# Patient Record
Sex: FEMALE | Race: WHITE | Employment: FULL TIME | URBAN - METROPOLITAN AREA
[De-identification: names, ages, dates, MRNs, and addresses within clinical notes are randomized per-mention and may not be internally consistent; named-entity substitution may affect disease eponyms.]

---

## 2018-01-03 ENCOUNTER — HOSPITAL ENCOUNTER (INPATIENT)
Age: 63
LOS: 4 days | Discharge: HOME | DRG: 871 | End: 2018-01-07
Attending: EMERGENCY MEDICINE | Admitting: HOSPITALIST
Payer: COMMERCIAL

## 2018-01-03 DIAGNOSIS — N61.1 ABSCESS OF BREAST: ICD-10-CM

## 2018-01-03 DIAGNOSIS — E11.10 DIABETIC KETOACIDOSIS WITHOUT COMA ASSOCIATED WITH TYPE 2 DIABETES MELLITUS (HCC): Primary | ICD-10-CM

## 2018-01-03 DIAGNOSIS — L03.319 CELLULITIS OF TRUNK, UNSPECIFIED SITE OF TRUNK: ICD-10-CM

## 2018-01-03 PROBLEM — I10 HTN (HYPERTENSION): Status: ACTIVE | Noted: 2018-01-03

## 2018-01-03 PROBLEM — R50.9 FEVER: Status: ACTIVE | Noted: 2018-01-03

## 2018-01-03 PROBLEM — D72.829 LEUKOCYTOSIS: Status: ACTIVE | Noted: 2018-01-03

## 2018-01-03 PROBLEM — N39.0 UTI (URINARY TRACT INFECTION): Status: ACTIVE | Noted: 2018-01-03

## 2018-01-03 PROBLEM — A41.9 SEPSIS (HCC): Status: ACTIVE | Noted: 2018-01-03

## 2018-01-03 LAB
ALBUMIN SERPL-MCNC: 2.7 G/DL (ref 3.2–4.6)
ALBUMIN/GLOB SERPL: 0.5 {RATIO} (ref 1.2–3.5)
ALP SERPL-CCNC: 127 U/L (ref 50–136)
ALT SERPL-CCNC: 12 U/L (ref 12–65)
ANION GAP SERPL CALC-SCNC: 19 MMOL/L (ref 7–16)
APPEARANCE UR: ABNORMAL
AST SERPL-CCNC: 8 U/L (ref 15–37)
BACTERIA URNS QL MICRO: ABNORMAL /HPF
BASOPHILS # BLD: 0 K/UL (ref 0–0.2)
BASOPHILS NFR BLD: 0 % (ref 0–2)
BILIRUB SERPL-MCNC: 0.9 MG/DL (ref 0.2–1.1)
BILIRUB UR QL: NEGATIVE
BUN SERPL-MCNC: 21 MG/DL (ref 8–23)
CALCIUM SERPL-MCNC: 8.8 MG/DL (ref 8.3–10.4)
CASTS URNS QL MICRO: 0 /LPF
CHLORIDE SERPL-SCNC: 98 MMOL/L (ref 98–107)
CO2 SERPL-SCNC: 16 MMOL/L (ref 21–32)
COLOR UR: YELLOW
CREAT SERPL-MCNC: 1.23 MG/DL (ref 0.6–1)
DIFFERENTIAL METHOD BLD: ABNORMAL
EOSINOPHIL # BLD: 0 K/UL (ref 0–0.8)
EOSINOPHIL NFR BLD: 0 % (ref 0.5–7.8)
EPI CELLS #/AREA URNS HPF: ABNORMAL /HPF
ERYTHROCYTE [DISTWIDTH] IN BLOOD BY AUTOMATED COUNT: 15.9 % (ref 11.9–14.6)
GLOBULIN SER CALC-MCNC: 5.6 G/DL (ref 2.3–3.5)
GLUCOSE BLD STRIP.AUTO-MCNC: 196 MG/DL (ref 65–100)
GLUCOSE BLD STRIP.AUTO-MCNC: 243 MG/DL (ref 65–100)
GLUCOSE BLD STRIP.AUTO-MCNC: 338 MG/DL (ref 65–100)
GLUCOSE BLD STRIP.AUTO-MCNC: 367 MG/DL (ref 65–100)
GLUCOSE BLD STRIP.AUTO-MCNC: 389 MG/DL (ref 65–100)
GLUCOSE BLD STRIP.AUTO-MCNC: 494 MG/DL (ref 65–100)
GLUCOSE SERPL-MCNC: 499 MG/DL (ref 65–100)
GLUCOSE UR STRIP.AUTO-MCNC: >1000 MG/DL
HCT VFR BLD AUTO: 34.7 % (ref 35.8–46.3)
HGB BLD-MCNC: 11.8 G/DL (ref 11.7–15.4)
HGB UR QL STRIP: ABNORMAL
IMM GRANULOCYTES # BLD: 0.1 K/UL (ref 0–0.5)
IMM GRANULOCYTES NFR BLD AUTO: 1 % (ref 0–5)
KETONES UR QL STRIP.AUTO: 40 MG/DL
LACTATE BLD-SCNC: 1.8 MMOL/L (ref 0.5–1.9)
LEUKOCYTE ESTERASE UR QL STRIP.AUTO: ABNORMAL
LYMPHOCYTES # BLD: 1 K/UL (ref 0.5–4.6)
LYMPHOCYTES NFR BLD: 6 % (ref 13–44)
MCH RBC QN AUTO: 30.3 PG (ref 26.1–32.9)
MCHC RBC AUTO-ENTMCNC: 34 G/DL (ref 31.4–35)
MCV RBC AUTO: 89 FL (ref 79.6–97.8)
MONOCYTES # BLD: 1.1 K/UL (ref 0.1–1.3)
MONOCYTES NFR BLD: 7 % (ref 4–12)
NEUTS SEG # BLD: 13.7 K/UL (ref 1.7–8.2)
NEUTS SEG NFR BLD: 86 % (ref 43–78)
NITRITE UR QL STRIP.AUTO: NEGATIVE
PH UR STRIP: 5 [PH] (ref 5–9)
PLATELET # BLD AUTO: 286 K/UL (ref 150–450)
PMV BLD AUTO: 11 FL (ref 10.8–14.1)
POTASSIUM SERPL-SCNC: 4.3 MMOL/L (ref 3.5–5.1)
PROCALCITONIN SERPL-MCNC: 0.4 NG/ML
PROT SERPL-MCNC: 8.3 G/DL (ref 6.3–8.2)
PROT UR STRIP-MCNC: 100 MG/DL
RBC # BLD AUTO: 3.9 M/UL (ref 4.05–5.25)
RBC #/AREA URNS HPF: ABNORMAL /HPF
SODIUM SERPL-SCNC: 133 MMOL/L (ref 136–145)
SP GR UR REFRACTOMETRY: 1.02 (ref 1–1.02)
UROBILINOGEN UR QL STRIP.AUTO: 0.2 EU/DL (ref 0.2–1)
WBC # BLD AUTO: 15.9 K/UL (ref 4.3–11.1)
WBC URNS QL MICRO: ABNORMAL /HPF

## 2018-01-03 PROCEDURE — 93005 ELECTROCARDIOGRAM TRACING: CPT | Performed by: EMERGENCY MEDICINE

## 2018-01-03 PROCEDURE — 81001 URINALYSIS AUTO W/SCOPE: CPT | Performed by: EMERGENCY MEDICINE

## 2018-01-03 PROCEDURE — 85025 COMPLETE CBC W/AUTO DIFF WBC: CPT | Performed by: STUDENT IN AN ORGANIZED HEALTH CARE EDUCATION/TRAINING PROGRAM

## 2018-01-03 PROCEDURE — 80053 COMPREHEN METABOLIC PANEL: CPT | Performed by: STUDENT IN AN ORGANIZED HEALTH CARE EDUCATION/TRAINING PROGRAM

## 2018-01-03 PROCEDURE — 99285 EMERGENCY DEPT VISIT HI MDM: CPT | Performed by: EMERGENCY MEDICINE

## 2018-01-03 PROCEDURE — 82962 GLUCOSE BLOOD TEST: CPT

## 2018-01-03 PROCEDURE — 87088 URINE BACTERIA CULTURE: CPT | Performed by: EMERGENCY MEDICINE

## 2018-01-03 PROCEDURE — 74011250636 HC RX REV CODE- 250/636: Performed by: EMERGENCY MEDICINE

## 2018-01-03 PROCEDURE — 74011000258 HC RX REV CODE- 258: Performed by: EMERGENCY MEDICINE

## 2018-01-03 PROCEDURE — 96374 THER/PROPH/DIAG INJ IV PUSH: CPT | Performed by: EMERGENCY MEDICINE

## 2018-01-03 PROCEDURE — 74011636637 HC RX REV CODE- 636/637: Performed by: EMERGENCY MEDICINE

## 2018-01-03 PROCEDURE — 87186 SC STD MICRODIL/AGAR DIL: CPT | Performed by: EMERGENCY MEDICINE

## 2018-01-03 PROCEDURE — 87040 BLOOD CULTURE FOR BACTERIA: CPT | Performed by: EMERGENCY MEDICINE

## 2018-01-03 PROCEDURE — 87086 URINE CULTURE/COLONY COUNT: CPT | Performed by: EMERGENCY MEDICINE

## 2018-01-03 PROCEDURE — 84145 PROCALCITONIN (PCT): CPT | Performed by: EMERGENCY MEDICINE

## 2018-01-03 PROCEDURE — 83605 ASSAY OF LACTIC ACID: CPT

## 2018-01-03 RX ORDER — HYDRALAZINE HYDROCHLORIDE 20 MG/ML
10 INJECTION INTRAMUSCULAR; INTRAVENOUS
Status: DISCONTINUED | OUTPATIENT
Start: 2018-01-03 | End: 2018-01-07 | Stop reason: HOSPADM

## 2018-01-03 RX ADMIN — SODIUM CHLORIDE 1000 ML: 900 INJECTION, SOLUTION INTRAVENOUS at 19:06

## 2018-01-03 RX ADMIN — HUMAN INSULIN 10 UNITS: 100 INJECTION, SOLUTION SUBCUTANEOUS at 19:34

## 2018-01-03 RX ADMIN — SODIUM CHLORIDE 7 UNITS/HR: 900 INJECTION, SOLUTION INTRAVENOUS at 20:16

## 2018-01-03 NOTE — ED TRIAGE NOTES
Pt arrived via ems from her md office. Pt's blood sugar was elevated. Initially it was 800 then 500 via ems. BGL is 494. Pt stopped taking her metformin 9 months ago.

## 2018-01-03 NOTE — IP AVS SNAPSHOT
[address] [facility] 
 
 
 [address] [facility] 
[phone] Patient: Tomasa Gonzalez MRN: [id number] ONC:[birth year] About your hospitalization You were admitted on:  January 3, 2018 You last received care in the:  [facility] 8 MED SURG You were discharged on:  January 7, 2018 Why you were hospitalized Your primary diagnosis was:  Dka (Diabetic Ketoacidoses) (Hcc) Your diagnoses also included:  Cellulitis Of Right Breast, Htn (Hypertension), Sepsis Due To Cellulitis (Hcc), Uti (Urinary Tract Infection) Follow-up Information Follow up With Details Comments Contact Info Roseann Atkins MD In 1 week Patient to call Monday and make appt within 1 week for follow up diabetes and cellulitis [address] [facility] [address] [facility] 
[phone] Discharge Orders None A check joyce indicates which time of day the medication should be taken. My Medications START taking these medications Instructions Each Dose to Equal  
 Morning Noon Evening Bedtime  
 cephALEXin 500 mg capsule Commonly known as:  Kait Gaytrae Your next dose is:  Due tonight at bedtime Take 1 Cap by mouth two (2) times a day for 7 days. 500 mg  
    
   
   
 9:00 PM  
  
 glucose blood VI test strips strip Commonly known as:  blood glucose test  
   
 Please dispense test strips for use with patient's glucose monitor * insulin glargine 100 unit/mL injection Commonly known as:  LANTUS Your next dose is:  Due in AM  
   
 40 Units by SubCUTAneous route daily. 40 Units * insulin glargine 100 unit/mL (3 mL) Inpn Commonly known as:  LANTUS,BASAGLAR  
   
 40 Units by SubCUTAneous route daily. 40 Units  
    
   
   
   
  
 insulin lispro 100 unit/mL injection Commonly known as:  HUMALOG Your next dose is:  Due before supper tonight 5 Units by SubCUTAneous route Before breakfast, lunch, and dinner. 5 Units 6:00 PM  
   
  
 Insulin Needles (Disposable) 30 gauge x 1/3\" by SubCUTAneous route See Admin Instructions. insulin syringe-needle U-100 0.3 mL 31 gauge x 15/64\" Syrg  
   
 100 Syringes by Does Not Apply route See Admin Instructions. Indications: for insulin administration 100 Syringe Lancets Misc For checking blood sugar level  
     
   
   
   
  
 lisinopril 20 mg tablet Commonly known as:  Leonor Averymer Your next dose is:  Due in AM  
   
 Take 1 Tab by mouth daily. 20 mg  
    
   
   
   
  
 nystatin powder Commonly known as:  MYCOSTATIN Your next dose is:  Due tonight at bedtime Apply  to affected area three (3) times daily for 14 days. 9:00 pm  
  
 trimethoprim-sulfamethoxazole 160-800 mg per tablet Commonly known as:  BACTRIM DS, SEPTRA DS Your next dose is:  Due tonight at bedtime Take 1 Tab by mouth two (2) times a day for 7 days. 1 Tab  
    
   
   
 9:00 pm  
  
 * Notice: This list has 2 medication(s) that are the same as other medications prescribed for you. Read the directions carefully, and ask your doctor or other care provider to review them with you. Where to Get Your Medications Information on where to get these meds will be given to you by the nurse or doctor. ! Ask your nurse or doctor about these medications  
  cephALEXin 500 mg capsule  
 glucose blood VI test strips strip  
 insulin glargine 100 unit/mL (3 mL) Inpn  
 insulin glargine 100 unit/mL injection  
 insulin lispro 100 unit/mL injection Insulin Needles (Disposable) 30 gauge x 1/3\" insulin syringe-needle U-100 0.3 mL 31 gauge x 15/64\" Syrg Lancets Misc  
 lisinopril 20 mg tablet  
 nystatin powder  
 trimethoprim-sulfamethoxazole 160-800 mg per tablet Discharge Instructions Cellulitis: Care Instructions Your Care Instructions Cellulitis is a skin infection. It often occurs after a break in the skin from a scrape, cut, bite, or puncture, or after a rash. The doctor has checked you carefully, but problems can develop later. If you notice any problems or new symptoms, get medical treatment right away. Follow-up care is a key part of your treatment and safety. Be sure to make and go to all appointments, and call your doctor if you are having problems. It's also a good idea to know your test results and keep a list of the medicines you take. How can you care for yourself at home? · Take your antibiotics as directed. Do not stop taking them just because you feel better. You need to take the full course of antibiotics. · Prop up the infected area on pillows to reduce pain and swelling. Try to keep the area above the level of your heart as often as you can. · If your doctor told you how to care for your wound, follow your doctor's instructions. If you did not get instructions, follow this general advice: ¨ Wash the wound with clean water 2 times a day. Don't use hydrogen peroxide or alcohol, which can slow healing. ¨ You may cover the wound with a thin layer of petroleum jelly, such as Vaseline, and a nonstick bandage. ¨ Apply more petroleum jelly and replace the bandage as needed. · Be safe with medicines. Take pain medicines exactly as directed. ¨ If the doctor gave you a prescription medicine for pain, take it as prescribed. ¨ If you are not taking a prescription pain medicine, ask your doctor if you can take an over-the-counter medicine. To prevent cellulitis in the future · Try to prevent cuts, scrapes, or other injuries to your skin. Cellulitis most often occurs where there is a break in the skin. · If you get a scrape, cut, mild burn, or bite, wash the wound with clean water as soon as you can to help avoid infection.  Don't use hydrogen peroxide or alcohol, which can slow healing. · If you have swelling in your legs (edema), support stockings and good skin care may help prevent leg sores and cellulitis. · Take care of your feet, especially if you have diabetes or other conditions that increase the risk of infection. Wear shoes and socks. Do not go barefoot. If you have athlete's foot or other skin problems on your feet, talk to your doctor about how to treat them. When should you call for help? Call your doctor now or seek immediate medical care if: 
? · You have signs that your infection is getting worse, such as: 
¨ Increased pain, swelling, warmth, or redness. ¨ Red streaks leading from the area. ¨ Pus draining from the area. ¨ A fever. ? · You get a rash. ? Watch closely for changes in your health, and be sure to contact your doctor if: 
? · You are not getting better after 1 day (24 hours). ? · You do not get better as expected. Where can you learn more? Go to http://todd-aleks.info/. Shira Ayon in the search box to learn more about \"Cellulitis: Care Instructions. \" Current as of: October 13, 2016 Content Version: 11.4 © 7540-7756 [employer]. Care instructions adapted under license by [employer] (which disclaims liability or warranty for this information). If you have questions about a medical condition or this instruction, always ask your healthcare professional. [place] [address] any warranty or liability for your use of this information. DISCHARGE SUMMARY from Nurse PATIENT INSTRUCTIONS: 
 
 
F-face looks uneven A-arms unable to move or move unevenly S-speech slurred or non-existent T-time-call 911 as soon as signs and symptoms begin-DO NOT go Back to bed or wait to see if you get better-TIME IS BRAIN.  
 
Warning Signs of HEART ATTACK  
 
 Call 911 if you have these symptoms: 
? Chest discomfort. Most heart attacks involve discomfort in the center of the chest that lasts more than a few minutes, or that goes away and comes back. It can feel like uncomfortable pressure, squeezing, fullness, or pain. ? Discomfort in other areas of the upper body. Symptoms can include pain or discomfort in one or both arms, the back, neck, jaw, or stomach. ? Shortness of breath with or without chest discomfort. ? Other signs may include breaking out in a cold sweat, nausea, or lightheadedness. Don't wait more than five minutes to call [address]! Fast action can save your life. Calling 911 is almost always the fastest way to get lifesaving treatment. Emergency Medical Services staff can begin treatment when they arrive up to an hour sooner than if someone gets to the hospital by car. The discharge information has been reviewed with the patient. The patient verbalized understanding. Discharge medications reviewed with the patient and appropriate educational materials and side effects teaching were provided. ___________________________________________________________________________________________________________________________________ [employer]hart Announcement We are excited to announce that we are making your provider's discharge notes available to you in ParStream. You will see these notes when they are completed and signed by the physician that discharged you from your recent hospital stay. If you have any questions or concerns about any information you see in ParStream, please call the Health Information Department where you were seen or reach out to your Primary Care Provider for more information about your plan of care. Introducing [facility]! Tabby Meraz introduces ParStream patient portal. Now you can access parts of your medical record, email your doctor's office, and request medication refills online. 1. In your internet browser, go to [email or web address] "[employer]/The Daily Callert 2. Click on the First Time User? Click Here link in the Sign In box. You will see the New Member Sign Up page. 3. Enter your Mingyian Access Code exactly as it appears below. You will not need to use this code after youve completed the sign-up process. If you do not sign up before the expiration date, you must request a new code. · Ming[employer]ian Access Code: [email or web address] Expires: 4/3/2018  9:34 PM 
 
4. Enter the last four digits of your Social Security Number (xxxx) and Date of Birth (mm/dd/yyyy) as indicated and click Submit. You will be taken to the next sign-up page. 5. Create a Tembo Studiot ID. This will be your Mingyian login ID and cannot be changed, so think of one that is secure and easy to remember. 6. Create a Mingyian password. You can change your password at any time. 7. Enter your Password Reset Question and Answer. This can be used at a later time if you forget your password. 8. Enter your e-mail address. You will receive e-mail notification when new information is available in [address] 9. Click Sign Up. You can now view and download portions of your medical record. 10. Click the Download Summary menu link to download a portable copy of your medical information. If you have questions, please visit the Frequently Asked Questions section of the Mingyian website. Remember, Mingyian is NOT to be used for urgent needs. For medical emergencies, dial 911. Now available from your iPhone and Android! Unresulted Labs-Please follow up with your PCP about these lab tests Order Current Status CULTURE, BLOOD Preliminary result CULTURE, BLOOD Preliminary result Providers Seen During Your Hospitalization Provider Specialty Primary office phone Beulah Arreguin MD Emergency Medicine [phone] Khoa Yoder MD Internal Medicine [phone] Your Primary Care Physician (PCP) Primary Care Physician Office Phone Office Fax [address] [facility] [phone] You are allergic to the following Allergen Reactions Plavix (Clopidogrel) Rash Recent Documentation Height Weight BMI OB Status Smoking Status 1.626 m 104.3 kg 39.48 kg/m2 Hysterectomy Never Smoker Emergency Contacts Name Discharge Info Relation Home Work Mobile Ashish Proctor  Spouse [3] [phone] Patient Belongings The following personal items are in your possession at time of discharge: 
  Dental Appliances: None  Visual Aid: Glasses      Home Medications: None   Jewelry: Ring (two)  Clothing: Shirt, Undergarments    Other Valuables: Cell Phone Please provide this summary of care documentation to your next provider. Signatures-by signing, you are acknowledging that this After Visit Summary has been reviewed with you and you have received a copy. Patient Signature:  ____________________________________________________________ Date:  ____________________________________________________________  
  
Aarti Escamilla Provider Signature:  ____________________________________________________________ Date:  ____________________________________________________________

## 2018-01-03 NOTE — Clinical Note
Status[de-identified] Inpatient [101] Type of Bed: Intensive Care [6] Inpatient Hospitalization Certified Necessary for the Following Reasons: 3. Patient receiving treatment that can only be provided in an inpatient setting (further clarification in H&P documentation) Admitting Diagnosis: DKA (diabetic ketoacidoses) ([facility] [[id number]] Admitting Diagnosis: Abscess of right breast [[id number]] Admitting Physician: Lily Conklin [[id number]] Attending Physician: Lily Conklin [[id number]] Estimated Length of Stay: 2 Midnights Discharge Plan[de-identified] Home with Office Follow-up

## 2018-01-04 ENCOUNTER — APPOINTMENT (OUTPATIENT)
Dept: ULTRASOUND IMAGING | Age: 63
DRG: 871 | End: 2018-01-04
Attending: INTERNAL MEDICINE
Payer: COMMERCIAL

## 2018-01-04 PROBLEM — R73.9 HYPERGLYCEMIA: Status: ACTIVE | Noted: 2018-01-04

## 2018-01-04 LAB
ALBUMIN SERPL-MCNC: 2.5 G/DL (ref 3.2–4.6)
ALBUMIN/GLOB SERPL: 0.5 {RATIO} (ref 1.2–3.5)
ALP SERPL-CCNC: 97 U/L (ref 50–136)
ALT SERPL-CCNC: 9 U/L (ref 12–65)
ANION GAP SERPL CALC-SCNC: 11 MMOL/L (ref 7–16)
ANION GAP SERPL CALC-SCNC: 12 MMOL/L (ref 7–16)
ANION GAP SERPL CALC-SCNC: 13 MMOL/L (ref 7–16)
ANION GAP SERPL CALC-SCNC: 15 MMOL/L (ref 7–16)
ANION GAP SERPL CALC-SCNC: 15 MMOL/L (ref 7–16)
AST SERPL-CCNC: 6 U/L (ref 15–37)
ATRIAL RATE: 91 BPM
BACTERIA SPEC CULT: ABNORMAL
BASOPHILS # BLD: 0 K/UL (ref 0–0.2)
BASOPHILS NFR BLD: 0 % (ref 0–2)
BILIRUB SERPL-MCNC: 0.5 MG/DL (ref 0.2–1.1)
BUN SERPL-MCNC: 13 MG/DL (ref 8–23)
BUN SERPL-MCNC: 18 MG/DL (ref 8–23)
BUN SERPL-MCNC: 19 MG/DL (ref 8–23)
BUN SERPL-MCNC: 19 MG/DL (ref 8–23)
BUN SERPL-MCNC: 20 MG/DL (ref 8–23)
CALCIUM SERPL-MCNC: 8.3 MG/DL (ref 8.3–10.4)
CALCIUM SERPL-MCNC: 8.4 MG/DL (ref 8.3–10.4)
CALCIUM SERPL-MCNC: 8.4 MG/DL (ref 8.3–10.4)
CALCIUM SERPL-MCNC: 8.7 MG/DL (ref 8.3–10.4)
CALCIUM SERPL-MCNC: 8.7 MG/DL (ref 8.3–10.4)
CALCULATED P AXIS, ECG09: 21 DEGREES
CALCULATED R AXIS, ECG10: -4 DEGREES
CALCULATED T AXIS, ECG11: 60 DEGREES
CHLORIDE SERPL-SCNC: 100 MMOL/L (ref 98–107)
CHLORIDE SERPL-SCNC: 102 MMOL/L (ref 98–107)
CHLORIDE SERPL-SCNC: 103 MMOL/L (ref 98–107)
CHLORIDE SERPL-SCNC: 105 MMOL/L (ref 98–107)
CHLORIDE SERPL-SCNC: 107 MMOL/L (ref 98–107)
CO2 SERPL-SCNC: 17 MMOL/L (ref 21–32)
CO2 SERPL-SCNC: 18 MMOL/L (ref 21–32)
CO2 SERPL-SCNC: 19 MMOL/L (ref 21–32)
CO2 SERPL-SCNC: 19 MMOL/L (ref 21–32)
CO2 SERPL-SCNC: 22 MMOL/L (ref 21–32)
CREAT SERPL-MCNC: 0.9 MG/DL (ref 0.6–1)
CREAT SERPL-MCNC: 0.97 MG/DL (ref 0.6–1)
CREAT SERPL-MCNC: 1.03 MG/DL (ref 0.6–1)
CREAT SERPL-MCNC: 1.08 MG/DL (ref 0.6–1)
CREAT SERPL-MCNC: 1.1 MG/DL (ref 0.6–1)
DIAGNOSIS, 93000: NORMAL
DIFFERENTIAL METHOD BLD: ABNORMAL
EOSINOPHIL # BLD: 0 K/UL (ref 0–0.8)
EOSINOPHIL NFR BLD: 0 % (ref 0.5–7.8)
ERYTHROCYTE [DISTWIDTH] IN BLOOD BY AUTOMATED COUNT: 15.6 % (ref 11.9–14.6)
EST. AVERAGE GLUCOSE BLD GHB EST-MCNC: 329 MG/DL
GLOBULIN SER CALC-MCNC: 4.9 G/DL (ref 2.3–3.5)
GLUCOSE BLD STRIP.AUTO-MCNC: 102 MG/DL (ref 65–100)
GLUCOSE BLD STRIP.AUTO-MCNC: 109 MG/DL (ref 65–100)
GLUCOSE BLD STRIP.AUTO-MCNC: 169 MG/DL (ref 65–100)
GLUCOSE BLD STRIP.AUTO-MCNC: 271 MG/DL (ref 65–100)
GLUCOSE BLD STRIP.AUTO-MCNC: 271 MG/DL (ref 65–100)
GLUCOSE BLD STRIP.AUTO-MCNC: 281 MG/DL (ref 65–100)
GLUCOSE BLD STRIP.AUTO-MCNC: 369 MG/DL (ref 65–100)
GLUCOSE SERPL-MCNC: 106 MG/DL (ref 65–100)
GLUCOSE SERPL-MCNC: 237 MG/DL (ref 65–100)
GLUCOSE SERPL-MCNC: 260 MG/DL (ref 65–100)
GLUCOSE SERPL-MCNC: 270 MG/DL (ref 65–100)
GLUCOSE SERPL-MCNC: 369 MG/DL (ref 65–100)
HBA1C MFR BLD: 13.1 % (ref 4.8–6)
HCT VFR BLD AUTO: 29.4 % (ref 35.8–46.3)
HGB BLD-MCNC: 9.9 G/DL (ref 11.7–15.4)
IMM GRANULOCYTES # BLD: 0.1 K/UL (ref 0–0.5)
IMM GRANULOCYTES NFR BLD AUTO: 1 % (ref 0–5)
LYMPHOCYTES # BLD: 1.1 K/UL (ref 0.5–4.6)
LYMPHOCYTES NFR BLD: 7 % (ref 13–44)
MAGNESIUM SERPL-MCNC: 1.4 MG/DL (ref 1.8–2.4)
MCH RBC QN AUTO: 29.9 PG (ref 26.1–32.9)
MCHC RBC AUTO-ENTMCNC: 33.7 G/DL (ref 31.4–35)
MCV RBC AUTO: 88.8 FL (ref 79.6–97.8)
MONOCYTES # BLD: 1.4 K/UL (ref 0.1–1.3)
MONOCYTES NFR BLD: 9 % (ref 4–12)
NEUTS SEG # BLD: 12.8 K/UL (ref 1.7–8.2)
NEUTS SEG NFR BLD: 83 % (ref 43–78)
P-R INTERVAL, ECG05: 148 MS
PHOSPHATE SERPL-MCNC: 0.9 MG/DL (ref 2.3–3.7)
PLATELET # BLD AUTO: 254 K/UL (ref 150–450)
PMV BLD AUTO: 10.6 FL (ref 10.8–14.1)
POTASSIUM SERPL-SCNC: 3.6 MMOL/L (ref 3.5–5.1)
POTASSIUM SERPL-SCNC: 3.7 MMOL/L (ref 3.5–5.1)
POTASSIUM SERPL-SCNC: 3.7 MMOL/L (ref 3.5–5.1)
POTASSIUM SERPL-SCNC: 3.8 MMOL/L (ref 3.5–5.1)
POTASSIUM SERPL-SCNC: 4.4 MMOL/L (ref 3.5–5.1)
PROT SERPL-MCNC: 7.4 G/DL (ref 6.3–8.2)
Q-T INTERVAL, ECG07: 368 MS
QRS DURATION, ECG06: 78 MS
QTC CALCULATION (BEZET), ECG08: 452 MS
RBC # BLD AUTO: 3.31 M/UL (ref 4.05–5.25)
SERVICE CMNT-IMP: ABNORMAL
SODIUM SERPL-SCNC: 132 MMOL/L (ref 136–145)
SODIUM SERPL-SCNC: 135 MMOL/L (ref 136–145)
SODIUM SERPL-SCNC: 135 MMOL/L (ref 136–145)
SODIUM SERPL-SCNC: 136 MMOL/L (ref 136–145)
SODIUM SERPL-SCNC: 140 MMOL/L (ref 136–145)
VENTRICULAR RATE, ECG03: 91 BPM
WBC # BLD AUTO: 15.5 K/UL (ref 4.3–11.1)

## 2018-01-04 PROCEDURE — 84100 ASSAY OF PHOSPHORUS: CPT | Performed by: HOSPITALIST

## 2018-01-04 PROCEDURE — 80053 COMPREHEN METABOLIC PANEL: CPT | Performed by: HOSPITALIST

## 2018-01-04 PROCEDURE — 65270000029 HC RM PRIVATE

## 2018-01-04 PROCEDURE — 82962 GLUCOSE BLOOD TEST: CPT

## 2018-01-04 PROCEDURE — 85025 COMPLETE CBC W/AUTO DIFF WBC: CPT | Performed by: HOSPITALIST

## 2018-01-04 PROCEDURE — 76641 ULTRASOUND BREAST COMPLETE: CPT

## 2018-01-04 PROCEDURE — 36415 COLL VENOUS BLD VENIPUNCTURE: CPT | Performed by: HOSPITALIST

## 2018-01-04 PROCEDURE — 74011636637 HC RX REV CODE- 636/637: Performed by: INTERNAL MEDICINE

## 2018-01-04 PROCEDURE — 74011250637 HC RX REV CODE- 250/637: Performed by: HOSPITALIST

## 2018-01-04 PROCEDURE — 74011000250 HC RX REV CODE- 250: Performed by: HOSPITALIST

## 2018-01-04 PROCEDURE — 74011250636 HC RX REV CODE- 250/636: Performed by: HOSPITALIST

## 2018-01-04 PROCEDURE — 74011250637 HC RX REV CODE- 250/637: Performed by: INTERNAL MEDICINE

## 2018-01-04 PROCEDURE — 74011000258 HC RX REV CODE- 258: Performed by: HOSPITALIST

## 2018-01-04 PROCEDURE — 80048 BASIC METABOLIC PNL TOTAL CA: CPT | Performed by: HOSPITALIST

## 2018-01-04 PROCEDURE — 74011250636 HC RX REV CODE- 250/636: Performed by: INTERNAL MEDICINE

## 2018-01-04 PROCEDURE — 83036 HEMOGLOBIN GLYCOSYLATED A1C: CPT | Performed by: HOSPITALIST

## 2018-01-04 PROCEDURE — 74011636637 HC RX REV CODE- 636/637: Performed by: HOSPITALIST

## 2018-01-04 PROCEDURE — 76937 US GUIDE VASCULAR ACCESS: CPT

## 2018-01-04 PROCEDURE — 87641 MR-STAPH DNA AMP PROBE: CPT | Performed by: INTERNAL MEDICINE

## 2018-01-04 PROCEDURE — 83735 ASSAY OF MAGNESIUM: CPT | Performed by: HOSPITALIST

## 2018-01-04 RX ORDER — LEVOFLOXACIN 5 MG/ML
750 INJECTION, SOLUTION INTRAVENOUS EVERY 24 HOURS
Status: DISCONTINUED | OUTPATIENT
Start: 2018-01-04 | End: 2018-01-05

## 2018-01-04 RX ORDER — SODIUM CHLORIDE 0.9 % (FLUSH) 0.9 %
5-10 SYRINGE (ML) INJECTION AS NEEDED
Status: DISCONTINUED | OUTPATIENT
Start: 2018-01-04 | End: 2018-01-07 | Stop reason: HOSPADM

## 2018-01-04 RX ORDER — SODIUM CHLORIDE 0.9 % (FLUSH) 0.9 %
5-10 SYRINGE (ML) INJECTION EVERY 8 HOURS
Status: DISCONTINUED | OUTPATIENT
Start: 2018-01-04 | End: 2018-01-07 | Stop reason: HOSPADM

## 2018-01-04 RX ORDER — SODIUM CHLORIDE 9 MG/ML
150 INJECTION, SOLUTION INTRAVENOUS CONTINUOUS
Status: DISCONTINUED | OUTPATIENT
Start: 2018-01-04 | End: 2018-01-05

## 2018-01-04 RX ORDER — INSULIN LISPRO 100 [IU]/ML
INJECTION, SOLUTION INTRAVENOUS; SUBCUTANEOUS EVERY 4 HOURS
Status: DISCONTINUED | OUTPATIENT
Start: 2018-01-04 | End: 2018-01-05

## 2018-01-04 RX ORDER — MAGNESIUM SULFATE 1 G/100ML
1 INJECTION INTRAVENOUS
Status: DISCONTINUED | OUTPATIENT
Start: 2018-01-04 | End: 2018-01-04 | Stop reason: SDUPTHER

## 2018-01-04 RX ORDER — ACETAMINOPHEN 325 MG/1
650 TABLET ORAL
Status: DISCONTINUED | OUTPATIENT
Start: 2018-01-04 | End: 2018-01-07 | Stop reason: HOSPADM

## 2018-01-04 RX ORDER — INSULIN GLARGINE 100 [IU]/ML
12 INJECTION, SOLUTION SUBCUTANEOUS DAILY
Status: DISCONTINUED | OUTPATIENT
Start: 2018-01-04 | End: 2018-01-05

## 2018-01-04 RX ORDER — SODIUM CHLORIDE 9 MG/ML
150 INJECTION, SOLUTION INTRAVENOUS CONTINUOUS
Status: DISCONTINUED | OUTPATIENT
Start: 2018-01-04 | End: 2018-01-04

## 2018-01-04 RX ORDER — DEXTROSE 40 %
15 GEL (GRAM) ORAL AS NEEDED
Status: DISCONTINUED | OUTPATIENT
Start: 2018-01-04 | End: 2018-01-07 | Stop reason: HOSPADM

## 2018-01-04 RX ORDER — DEXTROSE 40 %
15 GEL (GRAM) ORAL AS NEEDED
Status: DISCONTINUED | OUTPATIENT
Start: 2018-01-04 | End: 2018-01-04 | Stop reason: SDUPTHER

## 2018-01-04 RX ORDER — INSULIN LISPRO 100 [IU]/ML
INJECTION, SOLUTION INTRAVENOUS; SUBCUTANEOUS
Status: DISCONTINUED | OUTPATIENT
Start: 2018-01-04 | End: 2018-01-04

## 2018-01-04 RX ORDER — DEXTROSE MONOHYDRATE AND SODIUM CHLORIDE 5; .9 G/100ML; G/100ML
125 INJECTION, SOLUTION INTRAVENOUS CONTINUOUS
Status: DISCONTINUED | OUTPATIENT
Start: 2018-01-04 | End: 2018-01-04

## 2018-01-04 RX ORDER — DEXTROSE 50 % IN WATER (D50W) INTRAVENOUS SYRINGE
25-50 AS NEEDED
Status: DISCONTINUED | OUTPATIENT
Start: 2018-01-04 | End: 2018-01-07 | Stop reason: HOSPADM

## 2018-01-04 RX ORDER — MAGNESIUM SULFATE HEPTAHYDRATE 40 MG/ML
2 INJECTION, SOLUTION INTRAVENOUS ONCE
Status: COMPLETED | OUTPATIENT
Start: 2018-01-04 | End: 2018-01-04

## 2018-01-04 RX ORDER — DEXTROSE 50 % IN WATER (D50W) INTRAVENOUS SYRINGE
25-50 AS NEEDED
Status: DISCONTINUED | OUTPATIENT
Start: 2018-01-04 | End: 2018-01-04 | Stop reason: SDUPTHER

## 2018-01-04 RX ORDER — LISINOPRIL 5 MG/1
10 TABLET ORAL DAILY
Status: DISCONTINUED | OUTPATIENT
Start: 2018-01-04 | End: 2018-01-07 | Stop reason: HOSPADM

## 2018-01-04 RX ORDER — HYDROCODONE BITARTRATE AND ACETAMINOPHEN 5; 325 MG/1; MG/1
1 TABLET ORAL
Status: DISCONTINUED | OUTPATIENT
Start: 2018-01-04 | End: 2018-01-07 | Stop reason: HOSPADM

## 2018-01-04 RX ORDER — NYSTATIN 100000 [USP'U]/G
POWDER TOPICAL 3 TIMES DAILY
Status: DISCONTINUED | OUTPATIENT
Start: 2018-01-04 | End: 2018-01-07 | Stop reason: HOSPADM

## 2018-01-04 RX ORDER — NALOXONE HYDROCHLORIDE 0.4 MG/ML
0.4 INJECTION, SOLUTION INTRAMUSCULAR; INTRAVENOUS; SUBCUTANEOUS AS NEEDED
Status: DISCONTINUED | OUTPATIENT
Start: 2018-01-04 | End: 2018-01-07 | Stop reason: HOSPADM

## 2018-01-04 RX ADMIN — CLINDAMYCIN PHOSPHATE 600 MG: 150 INJECTION, SOLUTION INTRAVENOUS at 09:19

## 2018-01-04 RX ADMIN — NYSTATIN: 100000 POWDER TOPICAL at 12:47

## 2018-01-04 RX ADMIN — INSULIN GLARGINE 12 UNITS: 100 INJECTION, SOLUTION SUBCUTANEOUS at 09:19

## 2018-01-04 RX ADMIN — CLINDAMYCIN PHOSPHATE 600 MG: 150 INJECTION, SOLUTION INTRAVENOUS at 03:11

## 2018-01-04 RX ADMIN — INSULIN LISPRO 15 UNITS: 100 INJECTION, SOLUTION INTRAVENOUS; SUBCUTANEOUS at 12:33

## 2018-01-04 RX ADMIN — CLINDAMYCIN PHOSPHATE 600 MG: 150 INJECTION, SOLUTION INTRAVENOUS at 17:24

## 2018-01-04 RX ADMIN — SODIUM CHLORIDE 150 ML/HR: 900 INJECTION, SOLUTION INTRAVENOUS at 21:19

## 2018-01-04 RX ADMIN — NYSTATIN: 100000 POWDER TOPICAL at 17:26

## 2018-01-04 RX ADMIN — ACETAMINOPHEN 650 MG: 325 TABLET ORAL at 13:02

## 2018-01-04 RX ADMIN — INSULIN LISPRO 9 UNITS: 100 INJECTION, SOLUTION INTRAVENOUS; SUBCUTANEOUS at 17:24

## 2018-01-04 RX ADMIN — POTASSIUM PHOSPHATE, MONOBASIC AND POTASSIUM PHOSPHATE, DIBASIC: 224; 236 INJECTION, SOLUTION INTRAVENOUS at 06:45

## 2018-01-04 RX ADMIN — INSULIN LISPRO 9 UNITS: 100 INJECTION, SOLUTION INTRAVENOUS; SUBCUTANEOUS at 21:20

## 2018-01-04 RX ADMIN — INSULIN LISPRO 6 UNITS: 100 INJECTION, SOLUTION INTRAVENOUS; SUBCUTANEOUS at 06:31

## 2018-01-04 RX ADMIN — Medication 10 ML: at 06:33

## 2018-01-04 RX ADMIN — Medication 10 ML: at 05:16

## 2018-01-04 RX ADMIN — NYSTATIN: 100000 POWDER TOPICAL at 21:21

## 2018-01-04 RX ADMIN — MAGNESIUM SULFATE HEPTAHYDRATE 2 G: 40 INJECTION, SOLUTION INTRAVENOUS at 03:12

## 2018-01-04 RX ADMIN — LISINOPRIL 10 MG: 5 TABLET ORAL at 09:19

## 2018-01-04 RX ADMIN — LEVOFLOXACIN 750 MG: 5 INJECTION, SOLUTION INTRAVENOUS at 05:14

## 2018-01-04 RX ADMIN — ACETAMINOPHEN 650 MG: 325 TABLET ORAL at 00:38

## 2018-01-04 RX ADMIN — Medication 5 ML: at 21:24

## 2018-01-04 RX ADMIN — SODIUM CHLORIDE 150 ML/HR: 900 INJECTION, SOLUTION INTRAVENOUS at 12:33

## 2018-01-04 NOTE — DIABETES MGMT
Patient admitted 1/3/18 with DKA and abscess of right breast seen by diabetes educator. Pt sent to ED from emergency MD facility for DKA, blood glucose >800. Pt was admitted and placed on glucostablizer. HbA1c 13.1. EA. . History of type 2 DM for 20 years. Pt states that prior to admission mediations include Metformin, victoza, and actos, but states that she stopped taking the medications 9 months ago. Pt given educational material, \"Diabetes Self-Management: A Patient Teaching Guide\", which was reviewed with pt. Explained basic physiology of diabetes, as well as causes, s/s, and treatments for hypoglycemia and hyperglycemia. Described the effects of poor glycemic control on the development of long-term complications such as renal, eye, nerve, and cardiovascular disease. Described proper diabetic foot care and the importance of checking feet qd. Educated re: effects of carbohydrates on blood glucose, the \"plate method\" of healthy meal planning, basics of healthy meal plan, and Consistent Carbohydrate Diet. Also explained the relationship between hyperglycemia and infection. Discussed target goals for blood glucose and A1C. Pt verbalizes understanding of teaching. Explained the importance of blood glucose monitoring. Recommended frequency of qid ac, hs, and to record in log book to take to PCP appointment to assist with medication regimen. Pt states that she has a working One Touch meter at home and had been checking her blood glucose bid. Pt states that it was usually in the 140 range first thing in the am. Discussed target goals for blood glucose and HbA1c. Discussed the significance of an A1c of 13.1. Pt verbalizes understanding. Educated pt re: current basal/bolus regimen of Lantus 12 units daily and Humalog sliding scale coverage 4x/day ac and hs,  including type of insulin, timing with meals, onset, duration of effect, and peak of insulin dose. Pt verbalizes understanding.  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 Pt verbalizes understanding of site rotation, storage of insulin and proper sharps disposal.   Patient given contact information regarding outpatient diabetes education at [facility], but has declined a referral at this time. Pt states that she attended outpatient diabetes education on [facility] in the past. Stressed the need for follow up care for diabetes management with PCP. Pt has no further questions at this time.

## 2018-01-04 NOTE — PROGRESS NOTES
Problem: Falls - Risk of  Goal: *Absence of Falls  Document Keila Fall Risk and appropriate interventions in the flowsheet.    Outcome: Progressing Towards Goal  Fall Risk Interventions:  Mobility Interventions: Bed/chair exit alarm         Medication Interventions: Patient to call before getting OOB    Elimination Interventions: Bed/chair exit alarm, Call light in reach

## 2018-01-04 NOTE — H&P
Hospitalist H&P/Consult Note     Admit Date:  1/3/2018  6:47 PM   Name:  Catarino Sandoval   Age:  58 y.o.  :  1955   MRN:  [id number]   PCP:  Amrita Ernst MD  Treatment Team: Attending Provider: Tal Santoro MD; Primary Nurse: Matt Osei    HPI:   Pleasant 59 y/o female with hx diabetes presents from [facility] facility for admission for diabetic ketoacidosis and IV antibiotic treatment for cellulitis and abscess of right breast. She had developed some pain and redness of right breast and was evaluated. Glucose there was over 800. She received some IV insulin. She used to take metformin for her diabetes but stopped it 9 months ago after hearing negative things about it. A right breast abscess with cellulitis noticed and required incision and drainage. Then given IV clindamycin. Has a leukocytosis with left shift and tachycardia. Lactic acid 1.8. Glucose here 499 with a CO2 of 16 and anion gap 19. An insulin infusion started for DKA. She is fully alert and has no Kussmaul respirations. Urine sample is positive for ketones and appears positive for a UTI with 20-50 wbc, positive leuk esterase and 1+ bacteria. Will admit to ICU for glucose stabilizing and IV antibiotics for sepsis, abscess and cellulitis and UTI. 10 systems reviewed and negative except as noted in HPI. Past Medical History:   Diagnosis Date    Diabetes ([address]     Hypertension       History reviewed. No pertinent surgical history. None     Allergies   Allergen Reactions    Plavix [Clopidogrel] Rash      Social History   Substance Use Topics    Smoking status: Never Smoker    Smokeless tobacco: Never Used    Alcohol use No      History reviewed. No pertinent family history. There is no immunization history on file for this patient.     Objective:     Patient Vitals for the past 24 hrs:   Temp Pulse Resp BP SpO2   18 2351 (!) 101.1 °F (38.4 °C) - - - -   18 2343 - (!) 101 29 143/63 97 %   18 2323 - - - - 99 %   01/03/18 2322 - 100 28 (!) 129/96 -   01/03/18 2304 - - - - 97 %   01/03/18 2303 - (!) 109 26 (!) 136/111 -   01/03/18 2243 (!) 100.8 °F (38.2 °C) (!) 110 27 144/65 99 %   01/03/18 2223 - (!) 106 28 159/68 98 %   01/03/18 2203 - (!) 110 28 144/63 98 %   01/03/18 2143 - (!) 104 27 168/72 99 %   01/03/18 2123 - (!) 116 30 173/71 100 %   01/03/18 2106 - (!) 116 28 (!) 152/99 99 %   01/03/18 2050 - 100 - 160/69 99 %   01/03/18 2029 - (!) 104 - 184/76 100 %   01/03/18 2009 - - - 163/70 -   01/03/18 1842 98.4 °F (36.9 °C) (!) 101 18 (!) 204/82 100 %     Oxygen Therapy  O2 Sat (%): 97 % (01/03/18 2343)  Pulse via Oximetry: 103 beats per minute (01/03/18 2343)  O2 Device: Room air (01/03/18 2243)  No intake or output data in the 24 hours ending 01/03/18 2353    Physical Exam:  General:    Well nourished. Alert. Currently in ED hallway bed which limits exam   Eyes:   Normal sclera. Extraocular movements intact. ENT:  Normocephalic, atraumatic. Moist mucous membranes  CV:   Tachycardic and hypertensive. No murmur, rub, or gallop. Lungs:  CTAB. No wheezing, rhonchi, or rales. Abdomen: Soft, nontender, nondistended. Bowel sounds normal.   Extremities: Warm and dry. No cyanosis or edema. Neurologic: CN II-XII grossly intact. Sensation intact. Skin:     No rashes or jaundice. No wounds. Psych:  Normal mood and affect. I reviewed the labs, imaging, EKGs, telemetry, and other studies done this admission.   Data Review:   Recent Results (from the past 24 hour(s))   GLUCOSE, POC    Collection Time: 01/03/18  6:41 PM   Result Value Ref Range    Glucose (POC) 494 (HH) 65 - 100 mg/dL   CBC WITH AUTOMATED DIFF    Collection Time: 01/03/18  6:48 PM   Result Value Ref Range    WBC 15.9 (H) 4.3 - 11.1 K/uL    RBC 3.90 (L) 4.05 - 5.25 M/uL    HGB 11.8 11.7 - 15.4 g/dL    HCT 34.7 (L) 35.8 - 46.3 %    MCV 89.0 79.6 - 97.8 FL    MCH 30.3 26.1 - 32.9 PG    MCHC 34.0 31.4 - 35.0 g/dL    RDW 15.9 (H) 11.9 - 14.6 % PLATELET 721 426 - 197 K/uL    MPV 11.0 10.8 - 14.1 FL    DF AUTOMATED      NEUTROPHILS 86 (H) 43 - 78 %    LYMPHOCYTES 6 (L) 13 - 44 %    MONOCYTES 7 4.0 - 12.0 %    EOSINOPHILS 0 (L) 0.5 - 7.8 %    BASOPHILS 0 0.0 - 2.0 %    IMMATURE GRANULOCYTES 1 0.0 - 5.0 %    ABS. NEUTROPHILS 13.7 (H) 1.7 - 8.2 K/UL    ABS. LYMPHOCYTES 1.0 0.5 - 4.6 K/UL    ABS. MONOCYTES 1.1 0.1 - 1.3 K/UL    ABS. EOSINOPHILS 0.0 0.0 - 0.8 K/UL    ABS. BASOPHILS 0.0 0.0 - 0.2 K/UL    ABS. IMM. GRANS. 0.1 0.0 - 0.5 K/UL   METABOLIC PANEL, COMPREHENSIVE    Collection Time: 01/03/18  6:48 PM   Result Value Ref Range    Sodium 133 (L) 136 - 145 mmol/L    Potassium 4.3 3.5 - 5.1 mmol/L    Chloride 98 98 - 107 mmol/L    CO2 16 (L) 21 - 32 mmol/L    Anion gap 19 (H) 7 - 16 mmol/L    Glucose 499 (HH) 65 - 100 mg/dL    BUN 21 8 - 23 MG/DL    Creatinine 1.23 (H) 0.6 - 1.0 MG/DL    GFR est AA 57 (L) >60 ml/min/1.73m2    GFR est non-AA 47 (L) >60 ml/min/1.73m2    Calcium 8.8 8.3 - 10.4 MG/DL    Bilirubin, total 0.9 0.2 - 1.1 MG/DL    ALT (SGPT) 12 12 - 65 U/L    AST (SGOT) 8 (L) 15 - 37 U/L    Alk.  phosphatase 127 50 - 136 U/L    Protein, total 8.3 (H) 6.3 - 8.2 g/dL    Albumin 2.7 (L) 3.2 - 4.6 g/dL    Globulin 5.6 (H) 2.3 - 3.5 g/dL    A-G Ratio 0.5 (L) 1.2 - 3.5     PROCALCITONIN    Collection Time: 01/03/18  6:48 PM   Result Value Ref Range    Procalcitonin 0.4 ng/mL   URINALYSIS W/ RFLX MICROSCOPIC    Collection Time: 01/03/18  8:02 PM   Result Value Ref Range    Color YELLOW      Appearance CLOUDY      Specific gravity 1.016 1.001 - 1.023      pH (UA) 5.0 5.0 - 9.0      Protein 100 (A) NEG mg/dL    Glucose >1000 mg/dL    Ketone 40 (A) NEG mg/dL    Bilirubin NEGATIVE  NEG      Blood SMALL (A) NEG      Urobilinogen 0.2 0.2 - 1.0 EU/dL    Nitrites NEGATIVE  NEG      Leukocyte Esterase SMALL (A) NEG      WBC 20-50 0 /hpf    RBC 0-3 0 /hpf    Epithelial cells 0-3 0 /hpf    Bacteria 1+ (H) 0 /hpf    Casts 0 0 /lpf   POC LACTIC ACID    Collection Time: 01/03/18  8:34 PM   Result Value Ref Range    Lactic Acid (POC) 1.8 0.5 - 1.9 mmol/L   GLUCOSE, POC    Collection Time: 01/03/18  9:00 PM   Result Value Ref Range    Glucose (POC) 389 (H) 65 - 100 mg/dL   GLUCOSE, POC    Collection Time: 01/03/18  9:38 PM   Result Value Ref Range    Glucose (POC) 367 (H) 65 - 100 mg/dL   GLUCOSE, POC    Collection Time: 01/03/18  9:57 PM   Result Value Ref Range    Glucose (POC) 338 (H) 65 - 100 mg/dL   GLUCOSE, POC    Collection Time: 01/03/18 10:37 PM   Result Value Ref Range    Glucose (POC) 243 (H) 65 - 100 mg/dL   GLUCOSE, POC    Collection Time: 01/03/18 11:29 PM   Result Value Ref Range    Glucose (POC) 196 (H) 65 - 100 mg/dL       Imaging Studies:  CXR Results  (Last 48 hours)    None        CT Results  (Last 48 hours)    None          Assessment and Plan:     Hospital Problems as of 1/3/2018  Never Reviewed          Codes Class Noted - Resolved POA    * (Principal)DKA (diabetic ketoacidoses) ([facility] ICD-10-CM: E13.10  ICD-9-CM: 250.10  1/3/2018 - Present Yes        Abscess of right breast ICD-10-CM: N61.1  ICD-9-CM: 611.0  1/3/2018 - Present Yes        HTN (hypertension) ICD-10-CM: I10  ICD-9-CM: 401.9  1/3/2018 - Present Yes        Fever ICD-10-CM: R50.9  ICD-9-CM: 780.60  1/3/2018 - Present Yes        Leukocytosis ICD-10-CM: F11.187  ICD-9-CM: 288.60  1/3/2018 - Present Unknown        Sepsis ([facility] ICD-10-CM: A41.9  ICD-9-CM: 038.9, 995.91  1/3/2018 - Present Yes        UTI (urinary tract infection) ICD-10-CM: N39.0  ICD-9-CM: 599.0  1/3/2018 - Present Yes              PLAN:  · Admit inpatient to ICU  · Place on glucostabilizer for DKA until acidosis resolved and anion gap closed  · Continue vigorous IV fluids. Serial BMP, Mg, Phos  · Check A1c level. She will require long and short acting insulin at discharge  · Diabetic teaching.  Case management consult to assist with supplies/equipment  · Continue IV clindamycin for cellulitis and abscess of right breast. Add levaquin · Check nasal MRSA screen and urine culture  · Will need to follow-up on cultures obtained from referring facility who performed initial I & D  · Will need blood pressure control. With hx diabetes will start daily lisinopril.  Prn IV hydralazine for acute elevations  · lovenox for DVT prophylaxis  · Discussed with family at bedside      Estimated LOS: greater than 2 midnights     Signed:  Teresa Fatima MD

## 2018-01-04 NOTE — ED PROVIDER NOTES
HPI Comments: Patient was seen today at emergency M.D. For a breast abscess which was I&D. She was tachycardic and had labs done that showed an elevated blood sugar. Patient states she was given fluids and antibiotics. She reports stopping her metformin 9 months ago on her own accord because of reading negative things about medication on the Internet. She does not check her blood sugars at home. She denies nausea or vomiting. She denies any fevers. She reports some relief with the I&D. Reports the abscess has been there since December 29. Patient is a 58 y.o. female presenting with hyperglycemia. The history is provided by the patient. High Blood Sugar    This is a new problem. The current episode started 6 to 12 hours ago. The problem occurs constantly. The pain is associated with vomiting. The patient is experiencing no pain. Pertinent negatives include no fever, no nausea, no vomiting and no dysuria. Nothing worsens the pain. The pain is relieved by nothing. Her past medical history is significant for DM. Past Medical History:   Diagnosis Date    Diabetes ([facility]     Hypertension        History reviewed. No pertinent surgical history. History reviewed. No pertinent family history. Social History     Social History    Marital status:      Spouse name: N/A    Number of children: N/A    Years of education: N/A     Occupational History    Not on file. Social History Main Topics    Smoking status: Never Smoker    Smokeless tobacco: Never Used    Alcohol use No    Drug use: No    Sexual activity: Not on file     Other Topics Concern    Not on file     Social History Narrative    No narrative on file         ALLERGIES: Plavix [clopidogrel]    Review of Systems   Constitutional: Negative for chills and fever. Respiratory: Negative for cough and shortness of breath. Gastrointestinal: Negative for nausea and vomiting. Genitourinary: Negative for dysuria.    Skin: Positive for rash and wound. All other systems reviewed and are negative. Vitals:    01/03/18 1842   BP: (!) 204/82   Pulse: (!) 101   Resp: 18   Temp: 98.4 °F (36.9 °C)   SpO2: 100%   Weight: 104.3 kg (230 lb)   Height: 5' 4\" (1.626 m)            Physical Exam   Constitutional: She is oriented to person, place, and time. She appears well-developed and well-nourished. No distress. HENT:   Head: Normocephalic and atraumatic. Neck: Normal range of motion. Cardiovascular: Regular rhythm. Tachycardia present. Pulmonary/Chest: Effort normal and breath sounds normal. No respiratory distress. She has no wheezes. She has no rales. She exhibits tenderness (right breast with outer wound dressing for abscess and cellulitis). Abdominal: Soft. She exhibits no distension. There is no tenderness. There is no rebound and no guarding. Musculoskeletal: Normal range of motion. She exhibits no edema or tenderness. Neurological: She is alert and oriented to person, place, and time. No cranial nerve deficit. Skin: Skin is warm and dry. No rash noted. She is not diaphoretic. No erythema. Nursing note and vitals reviewed. MDM  Number of Diagnoses or Management Options  Abscess of breast:   Cellulitis of trunk, unspecified site of trunk:   Diabetic ketoacidosis without coma associated with type 2 diabetes mellitus [facility]):   Diagnosis management comments: Patient had labs that emergency M.D. Revealing DKA with CO2 of 16. She was given 1L IV fluids, 6 units IV insulin, and 600 mg of clindamycin at 1700. Blood sugar dropped from 823 to 499. Still acidotic with AG 19. Urine with >160 ketones. Patient will need admission for DKA secondary to cellulitis.   I ordered blood cultures here but she has already had antibiotics.    ===================================================================  This patient is critically ill and there is a high probability of of imminent or life threatening deterioration in the patient's condition without immediate management. The nature of the patient's clinical problem is: eneida pt stayed in ED because of no ICU beds and I spent a significant amount of time managing her care    I have spent 45 minutes in direct patient care, documentation, review of labs/xrays/old records, discussion with Family, Staff, Colleague, Nursing . The time involved in the performance of separately reportable procedures was not counted toward critical care time.      Holly Cm MD; 1/4/2018 @1:19 AM  ===================================================================           Amount and/or Complexity of Data Reviewed  Clinical lab tests: ordered and reviewed  Review and summarize past medical records: yes  Discuss the patient with other providers: yes  Independent visualization of images, tracings, or specimens: yes (SR no ST elevation normal QRS)    Risk of Complications, Morbidity, and/or Mortality  Presenting problems: high  Diagnostic procedures: moderate  Management options: high    Critical Care  Total time providing critical care: 30-74 minutes    Patient Progress  Patient progress: improved    ED Course       Procedures

## 2018-01-04 NOTE — PROGRESS NOTES
Care Management Interventions  PCP Verified by CM: Yes  Physical Therapy Consult: No  Occupational Therapy Consult: No  Current Support Network: Lives with Spouse  Confirm Follow Up Transport: Self  Plan discussed with Pt/Family/Caregiver: Yes  Freedom of Choice Offered: Yes  Discharge Location  Discharge Placement: Home   SW did not identify any dc needs.

## 2018-01-04 NOTE — PROGRESS NOTES
Patient arrived to room 827 via stretcher, accompanied by transport. Patient is alert and oriented. Dual assessment completed. Respirations  are even and unlabored. Dressing on the right breast is intact with redness around the breast. Redness under the breast fold. Redness on both legs and edema on lower extremities.

## 2018-01-04 NOTE — WOUND CARE
Right breast with 5m4t3ee open area with circumferential undermining 1-2.5cm, tunnel at 7 o'clock of 4+cm, purulent drainage, surrounding skin is peeling edematous, warm to touch, erythematous, concern for deep infection/ abscess. Dr. Isabell Castaneda notified of concerns for abscess. Noted plans for Ultrasound. May need surgical debridement for abscess. Recommend Iodoform packing, 4x4 and Tegaderm dressing daily. Will monitor.

## 2018-01-04 NOTE — PROGRESS NOTES
TRANSFER - IN REPORT:    Verbal report received from Nori Ny RN(name) on Tomasa Gonzalez  being received from ER(unit) for routine progression of care      Report consisted of patients Situation, Background, Assessment and   Recommendations(SBAR). Information from the following report(s) SBAR was reviewed with the receiving nurse. Opportunity for questions and clarification was provided. Assessment completed upon patients arrival to unit and care assumed.

## 2018-01-04 NOTE — PROGRESS NOTES
20 gauge IV established in right mid forearm using ultrasound by Jelly Navarro RN. Patient tolerated well.     20 gauge IV established in left mid forearm using ultrasound guidance by Sarah KASPER. Patient tolerated well.

## 2018-01-04 NOTE — PROGRESS NOTES
Hospitalist Progress Note    2018  Admit Date: 1/3/2018  6:47 PM   NAME: Andres Friedman   :  1955   MRN:  [id number]   Attending: Adeel Morales MD  PCP:  Mark Jensen MD    SUBJECTIVE:   Pleasant 59 y/o female with hx diabetes presents from [facility] facility for admission for diabetic ketoacidosis and IV antibiotic treatment for cellulitis and abscess of right breast. She had developed some pain and redness of right breast and was evaluated. Glucose there was over 800. She received some IV insulin. She used to take metformin for her diabetes but stopped it 9 months ago after hearing negative things about it. A right breast abscess with cellulitis noticed and required incision and drainage. Then given IV clindamycin. Has a leukocytosis with left shift and tachycardia. Lactic acid 1.8. Glucose here 499 with a CO2 of 16 and anion gap 19. An insulin infusion started for DKA. She is fully alert and has no Kussmaul respirations. Urine sample is positive for ketones and appears positive for a UTI with 20-50 wbc, positive leuk esterase and 1+ bacteria. : Off Insulin gtt and admitted to floor. Feels a bit better, still has pain and swelling of Rt breast. Afebrile. Sugars still in 300's, AG closed. Nursing notes and chart reviewed. Review of Systems negative with exception of pertinent positives noted above.     PHYSICAL EXAM     Visit Vitals    /77    Pulse 94    Temp 98.6 °F (37 °C)    Resp 20    Ht 5' 4\" (1.626 m)    Wt 104.3 kg (230 lb)    SpO2 98%    BMI 39.48 kg/m2      Temp (24hrs), Av.4 °F (37.4 °C), Min:97.8 °F (36.6 °C), Max:101.1 °F (38.4 °C)    Oxygen Therapy  O2 Sat (%): 98 % (18 1230)  Pulse via Oximetry: 95 beats per minute (18 0303)  O2 Device: Room air (18 0203)    Intake/Output Summary (Last 24 hours) at 18 1517  Last data filed at 18 0943   Gross per 24 hour   Intake              460 ml   Output              550 ml   Net -90 ml       General: No acute distress. Alert.    Lungs:  CTABL. Heart:  RRR, no murmur, rub, or gallop  Abdomen: Soft, non-distended, non-tender, +bs  Extremities: No cyanosis or clubbing. Skin:  Red Inflamed tender skin of Rt Breast, wound packed and draining some. Neurologic:  No focal deficits. Moves all extremities. Psych:  Normal affect    LABS AND STUDIES:  Personally reviewed all labs, meds, and studies for past 24hrs. ASSESSMENT      Active Hospital Problems    Diagnosis Date Noted    Hyperglycemia 01/04/2018    DKA (diabetic ketoacidoses) ([facility] 01/03/2018    Abscess of right breast 01/03/2018    HTN (hypertension) 01/03/2018    Fever 01/03/2018    Leukocytosis 01/03/2018    Sepsis ([facility] 01/03/2018    UTI (urinary tract infection) 01/03/2018           PLAN:    · DKA: Resolved, On Lantus and ISS resistant scale. Diabetic diet  · Uncontrolled DM: Diabetes educator consulted. A1C of >13. ·  Rt Breast Abscess: s/p I&D at Emergency MD. On clinda and levaquin. Wound care consult. Will get Sono to r/o remaining abscess pockets. Will get surgery if needed. · Follow Urine Cx, on Abx. · C/w IVF. Dispo: pending improvement      DVT ppx:  SCds. Discussed plan with pt who is in agreement. All questions answered.     Signed By: Epi Hyde MD     January 4, 2018

## 2018-01-04 NOTE — ROUTINE PROCESS
TRANSFER - OUT REPORT:    Verbal report given to Uyen Cruz RN(name) on Dg Pill  being transferred to 827(unit) for routine progression of care       Report consisted of patients Situation, Background, Assessment and   Recommendations(SBAR). Information from the following report(s) ED Summary was reviewed with the receiving nurse. Opportunity for questions and clarification was provided.       Patient transported with:   [employer]

## 2018-01-05 PROBLEM — E11.10 DKA (DIABETIC KETOACIDOSES): Status: RESOLVED | Noted: 2018-01-03 | Resolved: 2018-01-05

## 2018-01-05 PROBLEM — I10 HTN (HYPERTENSION): Chronic | Status: ACTIVE | Noted: 2018-01-03

## 2018-01-05 PROBLEM — L03.90 SEPSIS DUE TO CELLULITIS (HCC): Status: ACTIVE | Noted: 2018-01-03

## 2018-01-05 PROBLEM — N61.0 CELLULITIS OF RIGHT BREAST: Status: ACTIVE | Noted: 2018-01-03

## 2018-01-05 LAB
ANION GAP SERPL CALC-SCNC: 13 MMOL/L (ref 7–16)
BUN SERPL-MCNC: 11 MG/DL (ref 8–23)
CALCIUM SERPL-MCNC: 8.2 MG/DL (ref 8.3–10.4)
CHLORIDE SERPL-SCNC: 104 MMOL/L (ref 98–107)
CO2 SERPL-SCNC: 18 MMOL/L (ref 21–32)
CREAT SERPL-MCNC: 0.91 MG/DL (ref 0.6–1)
ERYTHROCYTE [DISTWIDTH] IN BLOOD BY AUTOMATED COUNT: 15.5 % (ref 11.9–14.6)
GLUCOSE BLD STRIP.AUTO-MCNC: 243 MG/DL (ref 65–100)
GLUCOSE BLD STRIP.AUTO-MCNC: 249 MG/DL (ref 65–100)
GLUCOSE BLD STRIP.AUTO-MCNC: 256 MG/DL (ref 65–100)
GLUCOSE BLD STRIP.AUTO-MCNC: 277 MG/DL (ref 65–100)
GLUCOSE BLD STRIP.AUTO-MCNC: 307 MG/DL (ref 65–100)
GLUCOSE BLD STRIP.AUTO-MCNC: 312 MG/DL (ref 65–100)
GLUCOSE SERPL-MCNC: 263 MG/DL (ref 65–100)
HCT VFR BLD AUTO: 31.5 % (ref 35.8–46.3)
HGB BLD-MCNC: 10.2 G/DL (ref 11.7–15.4)
MCH RBC QN AUTO: 29.6 PG (ref 26.1–32.9)
MCHC RBC AUTO-ENTMCNC: 32.4 G/DL (ref 31.4–35)
MCV RBC AUTO: 91.3 FL (ref 79.6–97.8)
PHOSPHATE SERPL-MCNC: 1.1 MG/DL (ref 2.3–3.7)
PLATELET # BLD AUTO: 277 K/UL (ref 150–450)
PMV BLD AUTO: 11.1 FL (ref 10.8–14.1)
POTASSIUM SERPL-SCNC: 3.6 MMOL/L (ref 3.5–5.1)
RBC # BLD AUTO: 3.45 M/UL (ref 4.05–5.25)
SODIUM SERPL-SCNC: 135 MMOL/L (ref 136–145)
WBC # BLD AUTO: 15.7 K/UL (ref 4.3–11.1)

## 2018-01-05 PROCEDURE — 36415 COLL VENOUS BLD VENIPUNCTURE: CPT | Performed by: HOSPITALIST

## 2018-01-05 PROCEDURE — 74011250637 HC RX REV CODE- 250/637: Performed by: HOSPITALIST

## 2018-01-05 PROCEDURE — 80048 BASIC METABOLIC PNL TOTAL CA: CPT | Performed by: HOSPITALIST

## 2018-01-05 PROCEDURE — 74011250636 HC RX REV CODE- 250/636: Performed by: INTERNAL MEDICINE

## 2018-01-05 PROCEDURE — 74011250637 HC RX REV CODE- 250/637: Performed by: INTERNAL MEDICINE

## 2018-01-05 PROCEDURE — 74011000250 HC RX REV CODE- 250: Performed by: INTERNAL MEDICINE

## 2018-01-05 PROCEDURE — 84100 ASSAY OF PHOSPHORUS: CPT | Performed by: INTERNAL MEDICINE

## 2018-01-05 PROCEDURE — 74011000250 HC RX REV CODE- 250: Performed by: HOSPITALIST

## 2018-01-05 PROCEDURE — 74011636637 HC RX REV CODE- 636/637: Performed by: INTERNAL MEDICINE

## 2018-01-05 PROCEDURE — 74011250636 HC RX REV CODE- 250/636: Performed by: HOSPITALIST

## 2018-01-05 PROCEDURE — 74011000258 HC RX REV CODE- 258: Performed by: HOSPITALIST

## 2018-01-05 PROCEDURE — 82962 GLUCOSE BLOOD TEST: CPT

## 2018-01-05 PROCEDURE — 65270000029 HC RM PRIVATE

## 2018-01-05 PROCEDURE — 85027 COMPLETE CBC AUTOMATED: CPT | Performed by: INTERNAL MEDICINE

## 2018-01-05 RX ORDER — SULFAMETHOXAZOLE AND TRIMETHOPRIM 800; 160 MG/1; MG/1
1 TABLET ORAL EVERY 12 HOURS
Status: DISCONTINUED | OUTPATIENT
Start: 2018-01-05 | End: 2018-01-06

## 2018-01-05 RX ORDER — INSULIN LISPRO 100 [IU]/ML
INJECTION, SOLUTION INTRAVENOUS; SUBCUTANEOUS
Status: DISCONTINUED | OUTPATIENT
Start: 2018-01-05 | End: 2018-01-07 | Stop reason: HOSPADM

## 2018-01-05 RX ORDER — INSULIN GLARGINE 100 [IU]/ML
20 INJECTION, SOLUTION SUBCUTANEOUS DAILY
Status: DISCONTINUED | OUTPATIENT
Start: 2018-01-05 | End: 2018-01-06

## 2018-01-05 RX ORDER — INSULIN LISPRO 100 [IU]/ML
5 INJECTION, SOLUTION INTRAVENOUS; SUBCUTANEOUS
Status: DISCONTINUED | OUTPATIENT
Start: 2018-01-05 | End: 2018-01-07 | Stop reason: HOSPADM

## 2018-01-05 RX ORDER — HEPARIN SODIUM 5000 [USP'U]/ML
5000 INJECTION, SOLUTION INTRAVENOUS; SUBCUTANEOUS EVERY 8 HOURS
Status: DISCONTINUED | OUTPATIENT
Start: 2018-01-05 | End: 2018-01-07 | Stop reason: HOSPADM

## 2018-01-05 RX ORDER — CLINDAMYCIN HYDROCHLORIDE 150 MG/1
300 CAPSULE ORAL EVERY 8 HOURS
Status: DISCONTINUED | OUTPATIENT
Start: 2018-01-05 | End: 2018-01-06

## 2018-01-05 RX ORDER — CEPHALEXIN 500 MG/1
500 CAPSULE ORAL EVERY 8 HOURS
Status: DISCONTINUED | OUTPATIENT
Start: 2018-01-05 | End: 2018-01-05

## 2018-01-05 RX ADMIN — INSULIN LISPRO 5 UNITS: 100 INJECTION, SOLUTION INTRAVENOUS; SUBCUTANEOUS at 08:48

## 2018-01-05 RX ADMIN — INSULIN LISPRO 6 UNITS: 100 INJECTION, SOLUTION INTRAVENOUS; SUBCUTANEOUS at 05:42

## 2018-01-05 RX ADMIN — INSULIN LISPRO 6 UNITS: 100 INJECTION, SOLUTION INTRAVENOUS; SUBCUTANEOUS at 12:46

## 2018-01-05 RX ADMIN — SULFAMETHOXAZOLE AND TRIMETHOPRIM 1 TABLET: 800; 160 TABLET ORAL at 08:43

## 2018-01-05 RX ADMIN — ACETAMINOPHEN 650 MG: 325 TABLET ORAL at 17:15

## 2018-01-05 RX ADMIN — Medication 5 ML: at 12:53

## 2018-01-05 RX ADMIN — HEPARIN SODIUM 5000 UNITS: 5000 INJECTION, SOLUTION INTRAVENOUS; SUBCUTANEOUS at 23:58

## 2018-01-05 RX ADMIN — LEVOFLOXACIN 750 MG: 5 INJECTION, SOLUTION INTRAVENOUS at 03:15

## 2018-01-05 RX ADMIN — INSULIN GLARGINE 20 UNITS: 100 INJECTION, SOLUTION SUBCUTANEOUS at 08:41

## 2018-01-05 RX ADMIN — INSULIN LISPRO 6 UNITS: 100 INJECTION, SOLUTION INTRAVENOUS; SUBCUTANEOUS at 21:44

## 2018-01-05 RX ADMIN — NYSTATIN: 100000 POWDER TOPICAL at 21:11

## 2018-01-05 RX ADMIN — NYSTATIN: 100000 POWDER TOPICAL at 08:48

## 2018-01-05 RX ADMIN — INSULIN LISPRO 5 UNITS: 100 INJECTION, SOLUTION INTRAVENOUS; SUBCUTANEOUS at 17:15

## 2018-01-05 RX ADMIN — CLINDAMYCIN HYDROCHLORIDE 300 MG: 150 CAPSULE ORAL at 08:43

## 2018-01-05 RX ADMIN — INSULIN LISPRO 8 UNITS: 100 INJECTION, SOLUTION INTRAVENOUS; SUBCUTANEOUS at 08:50

## 2018-01-05 RX ADMIN — INSULIN LISPRO 4 UNITS: 100 INJECTION, SOLUTION INTRAVENOUS; SUBCUTANEOUS at 17:27

## 2018-01-05 RX ADMIN — Medication 10 ML: at 21:12

## 2018-01-05 RX ADMIN — SULFAMETHOXAZOLE AND TRIMETHOPRIM 1 TABLET: 800; 160 TABLET ORAL at 21:11

## 2018-01-05 RX ADMIN — HEPARIN SODIUM 5000 UNITS: 5000 INJECTION, SOLUTION INTRAVENOUS; SUBCUTANEOUS at 17:15

## 2018-01-05 RX ADMIN — CLINDAMYCIN HYDROCHLORIDE 300 MG: 150 CAPSULE ORAL at 23:58

## 2018-01-05 RX ADMIN — Medication 5 ML: at 05:43

## 2018-01-05 RX ADMIN — SODIUM CHLORIDE 150 ML/HR: 900 INJECTION, SOLUTION INTRAVENOUS at 05:44

## 2018-01-05 RX ADMIN — POTASSIUM PHOSPHATE, MONOBASIC AND POTASSIUM PHOSPHATE, DIBASIC: 224; 236 INJECTION, SOLUTION INTRAVENOUS at 12:50

## 2018-01-05 RX ADMIN — INSULIN LISPRO 12 UNITS: 100 INJECTION, SOLUTION INTRAVENOUS; SUBCUTANEOUS at 01:36

## 2018-01-05 RX ADMIN — CLINDAMYCIN HYDROCHLORIDE 300 MG: 150 CAPSULE ORAL at 17:15

## 2018-01-05 RX ADMIN — LISINOPRIL 10 MG: 5 TABLET ORAL at 08:43

## 2018-01-05 RX ADMIN — CLINDAMYCIN PHOSPHATE 600 MG: 150 INJECTION, SOLUTION INTRAVENOUS at 01:35

## 2018-01-05 RX ADMIN — INSULIN LISPRO 5 UNITS: 100 INJECTION, SOLUTION INTRAVENOUS; SUBCUTANEOUS at 12:46

## 2018-01-05 RX ADMIN — NYSTATIN: 100000 POWDER TOPICAL at 17:17

## 2018-01-05 RX ADMIN — HEPARIN SODIUM 5000 UNITS: 5000 INJECTION, SOLUTION INTRAVENOUS; SUBCUTANEOUS at 08:44

## 2018-01-05 NOTE — DIABETES MGMT
Patient seen for continued diabetes education. . Reviewed current insulin regimen: Lantus 20 units daily, Humalog 5 units 3x/day ac and Humalog sliding scale coverage 4x/day ac and hs, including type of insulin, onset, peak of action, timing with meals, and duration of effect. Pt verbalizes understanding. Explained the importance of blood glucose monitoring. Reviewed target goals for HbA1c and blood glucose. Recommended keeping a log book to bring to PCP appointments to assist with medication regimen. Discussed the significance of an HbA1c of 13.1. Patient given contact information regarding outpatient diabetes education at [facility], but has declined referral at this time. Stressed the need for follow up care for diabetes management with PCP. Patient would prefer insulin pens at discharge. Patient will need for need prescriptions for insulin pens, pen needles, blood glucose test strips and lancets at discharge. Patient has no further questions at this time.

## 2018-01-05 NOTE — PROGRESS NOTES
Hospitalist Progress Note     Admit Date:  1/3/2018  6:47 PM   Name:  Flory Oviedo   Age:  58 y.o.  :  1955   MRN:  [id number]   PCP:  Baltazar Novak MD  Treatment Team: Attending Provider: Liat Howe MD; Care Manager: Haile Wilson. Linnea Jacobangelique    Subjective:   Pleasant 59 y/o female with hx diabetes [facility] for diabetic ketoacidosis and IV antibiotic treatment for cellulitis and abscess of right breast. She had developed some pain and redness of right breast and was evaluated. Glucose there was over 800. She received some IV insulin. She used to take metformin for her diabetes but stopped it 9 months ago after hearing negative things about it. A right breast abscess with cellulitis noticed and required incision and drainage. Then given IV clindamycin. Has a leukocytosis with left shift and tachycardia. Lactic acid 1.8. Glucose here 499 with a CO2 of 16 and anion gap 19. An insulin infusion started for DKA. She is fully alert and has no Kussmaul respirations. Urine sample is positive for ketones and appears positive for a UTI with 20-50 wbc, positive leuk esterase and 1+ bacteria. 1/5 - pt reports feeling better. Cellulitis improving/receding. No CP, SOB, diarrhea.       Objective:     Patient Vitals for the past 24 hrs:   Temp Pulse Resp BP SpO2   18 0729 98.5 °F (36.9 °C) 97 20 155/69 -   18 0300 99.5 °F (37.5 °C) 96 20 164/77 97 %   18 2335 99.8 °F (37.7 °C) 97 20 159/86 99 %   18 1935 (!) 100.8 °F (38.2 °C) 100 20 160/79 98 %   18 1230 98.6 °F (37 °C) 94 20 155/77 98 %   18 0813 97.8 °F (36.6 °C) (!) 102 22 154/57 99 %     Oxygen Therapy  O2 Sat (%): 97 % (18 0300)  Pulse via Oximetry: 95 beats per minute (18 0303)  O2 Device: Room air (18 0203)    Intake/Output Summary (Last 24 hours) at 18 0742  Last data filed at 18 1230   Gross per 24 hour   Intake              460 ml   Output 950 ml   Net             -490 ml         General:    Well nourished. Alert. CV:   RRR. No murmur, rub, or gallop. Lungs:   CTAB. No wheezing, rhonchi, or rales. Abdomen:   Soft, nontender, nondistended. Extremities: Warm and dry. No cyanosis or edema. Skin:     No rashes or jaundice. Cellulitis R breast receding from demarcation line    Data Review:  I have reviewed all labs, meds, telemetry events, and studies from the last 24 hours. Recent Results (from the past 24 hour(s))   METABOLIC PANEL, BASIC    Collection Time: 01/04/18 10:31 AM   Result Value Ref Range    Sodium 132 (L) 136 - 145 mmol/L    Potassium 4.4 3.5 - 5.1 mmol/L    Chloride 100 98 - 107 mmol/L    CO2 19 (L) 21 - 32 mmol/L    Anion gap 13 7 - 16 mmol/L    Glucose 369 (H) 65 - 100 mg/dL    BUN 19 8 - 23 MG/DL    Creatinine 1.08 (H) 0.6 - 1.0 MG/DL    GFR est AA >60 >60 ml/min/1.73m2    GFR est non-AA 55 (L) >60 ml/min/1.73m2    Calcium 8.7 8.3 - 10.4 MG/DL   GLUCOSE, POC    Collection Time: 01/04/18 11:01 AM   Result Value Ref Range    Glucose (POC) 369 (H) 65 - 100 mg/dL   GLUCOSE, POC    Collection Time: 01/04/18  4:25 PM   Result Value Ref Range    Glucose (POC) 271 (H) 65 - 451 mg/dL   METABOLIC PANEL, BASIC    Collection Time: 01/04/18  4:26 PM   Result Value Ref Range    Sodium 136 136 - 145 mmol/L    Potassium 3.8 3.5 - 5.1 mmol/L    Chloride 102 98 - 107 mmol/L    CO2 19 (L) 21 - 32 mmol/L    Anion gap 15 7 - 16 mmol/L    Glucose 270 (H) 65 - 100 mg/dL    BUN 18 8 - 23 MG/DL    Creatinine 1.03 (H) 0.6 - 1.0 MG/DL    GFR est AA >60 >60 ml/min/1.73m2    GFR est non-AA 58 (L) >60 ml/min/1.73m2    Calcium 8.7 8.3 - 10.4 MG/DL   MSSA/MRSA SC BY PCR, NASAL SWAB    Collection Time: 01/04/18  6:33 PM   Result Value Ref Range    Special Requests: NO SPECIAL REQUESTS      Culture result: (A)       MRSA target DNA not detected, SA target DNA detected.    A MRSA negative, SA positive test result does not preclude MRSA nasal colonization.    GLUCOSE, POC    Collection Time: 01/04/18  8:11 PM   Result Value Ref Range    Glucose (POC) 271 (H) 65 - 645 mg/dL   METABOLIC PANEL, BASIC    Collection Time: 01/04/18 11:17 PM   Result Value Ref Range    Sodium 135 (L) 136 - 145 mmol/L    Potassium 3.7 3.5 - 5.1 mmol/L    Chloride 105 98 - 107 mmol/L    CO2 18 (L) 21 - 32 mmol/L    Anion gap 12 7 - 16 mmol/L    Glucose 260 (H) 65 - 100 mg/dL    BUN 13 8 - 23 MG/DL    Creatinine 0.90 0.6 - 1.0 MG/DL    GFR est AA >60 >60 ml/min/1.73m2    GFR est non-AA >60 >60 ml/min/1.73m2    Calcium 8.4 8.3 - 10.4 MG/DL   GLUCOSE, POC    Collection Time: 01/05/18  1:22 AM   Result Value Ref Range    Glucose (POC) 312 (H) 65 - 443 mg/dL   METABOLIC PANEL, BASIC    Collection Time: 01/05/18  4:02 AM   Result Value Ref Range    Sodium 135 (L) 136 - 145 mmol/L    Potassium 3.6 3.5 - 5.1 mmol/L    Chloride 104 98 - 107 mmol/L    CO2 18 (L) 21 - 32 mmol/L    Anion gap 13 7 - 16 mmol/L    Glucose 263 (H) 65 - 100 mg/dL    BUN 11 8 - 23 MG/DL    Creatinine 0.91 0.6 - 1.0 MG/DL    GFR est AA >60 >60 ml/min/1.73m2    GFR est non-AA >60 >60 ml/min/1.73m2    Calcium 8.2 (L) 8.3 - 10.4 MG/DL   GLUCOSE, POC    Collection Time: 01/05/18  5:17 AM   Result Value Ref Range    Glucose (POC) 249 (H) 65 - 100 mg/dL        All Micro Results     Procedure Component Value Units Date/Time    CULTURE, BLOOD [[id number]] Collected:  01/03/18 2134    Order Status:  Completed Specimen:  Blood from Blood Updated:  01/05/18 0616     Special Requests: --        LEFT  HAND       Culture result: NO GROWTH 2 DAYS       CULTURE, BLOOD [[id number]] Collected:  01/03/18 2135    Order Status:  Completed Specimen:  Blood from Blood Updated:  01/05/18 0616     Special Requests: --        RIGHT  HAND       Culture result: NO GROWTH 2 DAYS       MSSA/MRSA SC BY PCR, NASAL SWAB [[id number]]  (Abnormal) Collected:  01/04/18 1833    Order Status:  Completed Specimen:  Nasal Swab Updated:  01/04/18 2100 Special Requests: NO SPECIAL REQUESTS        Culture result:         MRSA target DNA not detected, SA target DNA detected. A MRSA negative, SA positive test result does not preclude MRSA nasal colonization. (A)    CULTURE, URINE [[id number]] Collected:  01/03/18 2135    Order Status:  Completed Specimen:  Urine from Urine Updated:  01/04/18 0872     Special Requests: NO SPECIAL REQUESTS        Culture result:         NO GROWTH AFTER SHORT PERIOD OF INCUBATION. FURTHER RESULTS TO FOLLOW AFTER OVERNIGHT INCUBATION. MRSA SCREEN - PCR (NASAL) [[id number]]     Order Status:  Sent Specimen:  Nasal from Nares           Current Meds:  Current Facility-Administered Medications   Medication Dose Route Frequency    insulin glargine (LANTUS) injection 20 Units  20 Units SubCUTAneous DAILY    insulin lispro (HUMALOG) injection 5 Units  5 Units SubCUTAneous TIDAC    insulin lispro (HUMALOG) injection   SubCUTAneous AC&HS    clindamycin (CLEOCIN) capsule 300 mg  300 mg Oral Q8H    heparin (porcine) injection 5,000 Units  5,000 Units SubCUTAneous Q8H    trimethoprim-sulfamethoxazole (BACTRIM DS, SEPTRA DS) 160-800 mg per tablet 1 Tab  1 Tab Oral Q12H    sodium chloride (NS) flush 5-10 mL  5-10 mL IntraVENous Q8H    sodium chloride (NS) flush 5-10 mL  5-10 mL IntraVENous PRN    acetaminophen (TYLENOL) tablet 650 mg  650 mg Oral Q4H PRN    HYDROcodone-acetaminophen (NORCO) 5-325 mg per tablet 1 Tab  1 Tab Oral Q4H PRN    naloxone (NARCAN) injection 0.4 mg  0.4 mg IntraVENous PRN    lisinopril (PRINIVIL, ZESTRIL) tablet 10 mg  10 mg Oral DAILY    dextrose 40% (GLUTOSE) oral gel 1 Tube  15 g Oral PRN    glucagon (GLUCAGEN) injection 1 mg  1 mg IntraMUSCular PRN    dextrose (D50W) injection syrg 12.5-25 g  25-50 mL IntraVENous PRN    nystatin (MYCOSTATIN) 100,000 unit/gram powder   Topical TID    hydrALAZINE (APRESOLINE) 20 mg/mL injection 10 mg  10 mg IntraVENous Q6H PRN       Other Studies (last 24 hours):   Us Breast Rt Complete 4 Quad    Result Date: 1/4/2018  Right breast ultrasound INDICATION: Right breast abscess Ultrasound of the right breast shows diffuse soft tissue edema. No discrete fluid collection or mass is seen. There are small right axillary lymph nodes, the largest 9 mm. IMPRESSION: No abscess or mass identified in the right breast. BI-RADS Assessment Category 2: Benign finding. Annual mammograms are recommended for all women over the age of 36. A reminder letter will be sent to the patient. .      Assessment and Plan:     Hospital Problems as of 1/5/2018  Never Reviewed          Codes Class Noted - Resolved POA    Cellulitis of right breast ICD-10-CM: N61.0  ICD-9-CM: 611.0  1/3/2018 - Present Yes        HTN (hypertension) (Chronic) ICD-10-CM: I10  ICD-9-CM: 401.9  1/3/2018 - Present Yes        Sepsis due to cellulitis [facility]) ICD-10-CM: L03.90, A41.9  ICD-9-CM: 682.9, 995.91  1/3/2018 - Present Yes        UTI (urinary tract infection) ICD-10-CM: N39.0  ICD-9-CM: 599.0  1/3/2018 - Present Yes        * (Principal)RESOLVED: [facility]oses[facility] ICD-10-CM: E13.10  ICD-9-CM: 250.10  1/3/2018 - 1/5/2018 Yes              PLAN:    · DKA -  DM uncontrolled. Increase lantus to 20 units and add prandial insulin 5 units. · Sepsis - last fever last night. Cont clinda for cellulitis; no abscess or mass on breast US. Switch levaquin to bactrim for UTI; has same E coli coverage as levaquin but will also give better MRSA coverage for cellulitis than clinda. Follow cultures for final ID.     DC planning/Dispo:  Home when ready  DVT ppx:  heparin    Signed:  Callum Garcias MD

## 2018-01-05 NOTE — WOUND CARE
Noted no concern for abscess per ultrasound right breast, erythema is receeding, recommend daily packing with Iodoform and gauze and Tegaderm. Will monitor.

## 2018-01-05 NOTE — PROGRESS NOTES
Resting quietly in bed without complaints. IV infusing via right arm access. Heparin well intact left arm.

## 2018-01-06 LAB
ERYTHROCYTE [DISTWIDTH] IN BLOOD BY AUTOMATED COUNT: 15.3 % (ref 11.9–14.6)
GLUCOSE BLD STRIP.AUTO-MCNC: 133 MG/DL (ref 65–100)
GLUCOSE BLD STRIP.AUTO-MCNC: 282 MG/DL (ref 65–100)
GLUCOSE BLD STRIP.AUTO-MCNC: 320 MG/DL (ref 65–100)
GLUCOSE BLD STRIP.AUTO-MCNC: 407 MG/DL (ref 65–100)
HCT VFR BLD AUTO: 31 % (ref 35.8–46.3)
HGB BLD-MCNC: 9.9 G/DL (ref 11.7–15.4)
MCH RBC QN AUTO: 28.9 PG (ref 26.1–32.9)
MCHC RBC AUTO-ENTMCNC: 31.9 G/DL (ref 31.4–35)
MCV RBC AUTO: 90.6 FL (ref 79.6–97.8)
PHOSPHATE SERPL-MCNC: 2.1 MG/DL (ref 2.3–3.7)
PLATELET # BLD AUTO: 262 K/UL (ref 150–450)
PMV BLD AUTO: 11.1 FL (ref 10.8–14.1)
RBC # BLD AUTO: 3.42 M/UL (ref 4.05–5.25)
WBC # BLD AUTO: 14.4 K/UL (ref 4.3–11.1)

## 2018-01-06 PROCEDURE — 74011636637 HC RX REV CODE- 636/637: Performed by: INTERNAL MEDICINE

## 2018-01-06 PROCEDURE — 36415 COLL VENOUS BLD VENIPUNCTURE: CPT | Performed by: INTERNAL MEDICINE

## 2018-01-06 PROCEDURE — 82962 GLUCOSE BLOOD TEST: CPT

## 2018-01-06 PROCEDURE — 84100 ASSAY OF PHOSPHORUS: CPT | Performed by: INTERNAL MEDICINE

## 2018-01-06 PROCEDURE — 65270000029 HC RM PRIVATE

## 2018-01-06 PROCEDURE — 85027 COMPLETE CBC AUTOMATED: CPT | Performed by: INTERNAL MEDICINE

## 2018-01-06 PROCEDURE — 74011250637 HC RX REV CODE- 250/637: Performed by: HOSPITALIST

## 2018-01-06 PROCEDURE — 74011250636 HC RX REV CODE- 250/636: Performed by: INTERNAL MEDICINE

## 2018-01-06 PROCEDURE — 74011250637 HC RX REV CODE- 250/637: Performed by: INTERNAL MEDICINE

## 2018-01-06 RX ORDER — INSULIN GLARGINE 100 [IU]/ML
35 INJECTION, SOLUTION SUBCUTANEOUS DAILY
Status: DISCONTINUED | OUTPATIENT
Start: 2018-01-06 | End: 2018-01-06

## 2018-01-06 RX ORDER — INSULIN LISPRO 100 [IU]/ML
20 INJECTION, SOLUTION INTRAVENOUS; SUBCUTANEOUS ONCE
Status: COMPLETED | OUTPATIENT
Start: 2018-01-06 | End: 2018-01-06

## 2018-01-06 RX ORDER — VANCOMYCIN/0.9 % SOD CHLORIDE 1.5G/250ML
1500 PLASTIC BAG, INJECTION (ML) INTRAVENOUS EVERY 12 HOURS
Status: DISCONTINUED | OUTPATIENT
Start: 2018-01-06 | End: 2018-01-07 | Stop reason: HOSPADM

## 2018-01-06 RX ORDER — INSULIN GLARGINE 100 [IU]/ML
5 INJECTION, SOLUTION SUBCUTANEOUS
Status: COMPLETED | OUTPATIENT
Start: 2018-01-06 | End: 2018-01-06

## 2018-01-06 RX ORDER — INSULIN GLARGINE 100 [IU]/ML
40 INJECTION, SOLUTION SUBCUTANEOUS DAILY
Status: DISCONTINUED | OUTPATIENT
Start: 2018-01-07 | End: 2018-01-07 | Stop reason: HOSPADM

## 2018-01-06 RX ORDER — SODIUM,POTASSIUM PHOSPHATES 280-250MG
2 POWDER IN PACKET (EA) ORAL 4 TIMES DAILY
Status: COMPLETED | OUTPATIENT
Start: 2018-01-06 | End: 2018-01-06

## 2018-01-06 RX ORDER — SODIUM,POTASSIUM PHOSPHATES 280-250MG
1 POWDER IN PACKET (EA) ORAL 4 TIMES DAILY
Status: DISCONTINUED | OUTPATIENT
Start: 2018-01-06 | End: 2018-01-06

## 2018-01-06 RX ADMIN — INSULIN LISPRO 5 UNITS: 100 INJECTION, SOLUTION INTRAVENOUS; SUBCUTANEOUS at 06:21

## 2018-01-06 RX ADMIN — HEPARIN SODIUM 5000 UNITS: 5000 INJECTION, SOLUTION INTRAVENOUS; SUBCUTANEOUS at 23:26

## 2018-01-06 RX ADMIN — INSULIN LISPRO 5 UNITS: 100 INJECTION, SOLUTION INTRAVENOUS; SUBCUTANEOUS at 12:20

## 2018-01-06 RX ADMIN — NYSTATIN: 100000 POWDER TOPICAL at 16:54

## 2018-01-06 RX ADMIN — POTASSIUM & SODIUM PHOSPHATES POWDER PACK 280-160-250 MG 2 PACKET: 280-160-250 PACK at 08:54

## 2018-01-06 RX ADMIN — INSULIN LISPRO 8 UNITS: 100 INJECTION, SOLUTION INTRAVENOUS; SUBCUTANEOUS at 12:21

## 2018-01-06 RX ADMIN — INSULIN GLARGINE 35 UNITS: 100 INJECTION, SOLUTION SUBCUTANEOUS at 08:53

## 2018-01-06 RX ADMIN — POTASSIUM & SODIUM PHOSPHATES POWDER PACK 280-160-250 MG 2 PACKET: 280-160-250 PACK at 12:22

## 2018-01-06 RX ADMIN — HEPARIN SODIUM 5000 UNITS: 5000 INJECTION, SOLUTION INTRAVENOUS; SUBCUTANEOUS at 16:55

## 2018-01-06 RX ADMIN — VANCOMYCIN HYDROCHLORIDE 1500 MG: 10 INJECTION, POWDER, LYOPHILIZED, FOR SOLUTION INTRAVENOUS at 21:26

## 2018-01-06 RX ADMIN — INSULIN LISPRO 5 UNITS: 100 INJECTION, SOLUTION INTRAVENOUS; SUBCUTANEOUS at 16:52

## 2018-01-06 RX ADMIN — Medication 5 ML: at 12:23

## 2018-01-06 RX ADMIN — Medication 10 ML: at 22:00

## 2018-01-06 RX ADMIN — NYSTATIN: 100000 POWDER TOPICAL at 21:20

## 2018-01-06 RX ADMIN — NYSTATIN: 100000 POWDER TOPICAL at 09:00

## 2018-01-06 RX ADMIN — HEPARIN SODIUM 5000 UNITS: 5000 INJECTION, SOLUTION INTRAVENOUS; SUBCUTANEOUS at 08:54

## 2018-01-06 RX ADMIN — INSULIN LISPRO 20 UNITS: 100 INJECTION, SOLUTION INTRAVENOUS; SUBCUTANEOUS at 16:53

## 2018-01-06 RX ADMIN — INSULIN GLARGINE 5 UNITS: 100 INJECTION, SOLUTION SUBCUTANEOUS at 16:53

## 2018-01-06 RX ADMIN — LISINOPRIL 10 MG: 5 TABLET ORAL at 08:54

## 2018-01-06 RX ADMIN — VANCOMYCIN HYDROCHLORIDE 1500 MG: 10 INJECTION, POWDER, LYOPHILIZED, FOR SOLUTION INTRAVENOUS at 12:20

## 2018-01-06 RX ADMIN — INSULIN LISPRO 6 UNITS: 100 INJECTION, SOLUTION INTRAVENOUS; SUBCUTANEOUS at 06:20

## 2018-01-06 NOTE — PROGRESS NOTES
Resting quietly in bed, tv on. Respirations even and unlabored at 14. No distress at this time. Safety measures in place. Will continue to monitor.

## 2018-01-06 NOTE — PROGRESS NOTES
Bedside report received from night nurse Cydney. Assessment done as noted  Respiration even and unlabored 20/min; denies pain or nausea at present. Dressing to right breast CDI. Up in a chair. Encouraged to call with needs.

## 2018-01-06 NOTE — PROGRESS NOTES
Pharmacokinetic Consult to Pharmacist    Maya Freeman is a 58 y.o. female being treated for UTI and breast cellulitis with Vancomycin and clindamycin. The patient was previously on bactrim and clindamycin, but continues to spike fevers, so switched to vancomycin plus clindamycin. Height: 5' 4\" (162.6 cm)  Weight: 104.3 kg (230 lb)  Lab Results   Component Value Date/Time    BUN 11 01/05/2018 04:02 AM    Creatinine 0.91 01/05/2018 04:02 AM    WBC 14.4 01/06/2018 06:18 AM    Procalcitonin 0.4 01/03/2018 06:48 PM    Lactic Acid (POC) 1.8 01/03/2018 08:34 PM      Estimated Creatinine Clearance: 75.4 mL/min (based on Cr of 0.91). CULTURES:  1/3   Blood X 2  No growth day 3, pending     Urine   >100k staph aureus, pending      Day 1 of vancomycin. Goal trough is 12-18. Vancomycin dose initiated at 1500 mg Q12H. Plan trough prior to the 4th dose. Will continue to follow patient.       Thank you,  João Dolan, PharmD  Clinical Pharmacist  [phone]

## 2018-01-06 NOTE — PROGRESS NOTES
Patient slept all night. Sitting up in bed watching tv. Respirations even and unlabored. Will continue to monitor.

## 2018-01-06 NOTE — PROGRESS NOTES
Report received from [facility]. Patient sitting up in chair watching tv. Alert and oriented x 4. Respirations even and unlabored at 18. Will continue to monitor.

## 2018-01-06 NOTE — PROGRESS NOTES
Hospitalist Progress Note     Admit Date:  1/3/2018  6:47 PM   Name:  Britton Briggs   Age:  58 y.o.  :  1955   MRN:  [id number]   PCP:  Sandy Melton MD  Treatment Team: Attending Provider: Leif Arellano MD; Care Manager: Casandra Power. Cristine Martinez; Utilization Review: Carol Alfaro RN    Subjective:   Pleasant 59 y/o female with hx diabetes [facility] admission for diabetic ketoacidosis and IV antibiotic treatment for cellulitis and abscess of right breast. She had developed some pain and redness of right breast and was evaluated. Glucose there was over 800. She received some IV insulin. She used to take metformin for her diabetes but stopped it 9 months ago after hearing negative things about it. A right breast abscess with cellulitis noticed and required incision and drainage. Then given IV clindamycin. Has a leukocytosis with left shift and tachycardia. Lactic acid 1.8. Glucose here 499 with a CO2 of 16 and anion gap 19. An insulin infusion started for DKA. She is fully alert and has no Kussmaul respirations. Urine sample is positive for ketones and appears positive for a UTI with 20-50 wbc, positive leuk esterase and 1+ bacteria. Urine culture with staph. 1/5 - pt reports feeling better. Cellulitis improving/receding. No CP, SOB, diarrhea. 1/6 - pt feeling better. Cellulitis improving. Fever this morning.   No CP, SOB, diarrhea      Objective:     Patient Vitals for the past 24 hrs:   Temp Pulse Resp BP SpO2   18 0739 98.1 °F (36.7 °C) 93 18 130/68 -   18 0325 (!) 100.5 °F (38.1 °C) 93 19 154/73 98 %   18 2314 99 °F (37.2 °C) 88 19 137/65 98 %   18 2312 99 °F (37.2 °C) 90 20 139/68 98 %   18 1900 98.1 °F (36.7 °C) 89 20 148/73 98 %   18 1624 98.3 °F (36.8 °C) 98 20 161/78 -   18 1204 98.1 °F (36.7 °C) 98 20 155/84 99 %     Oxygen Therapy  O2 Sat (%): 98 % (18 0325)  Pulse via Oximetry: 95 beats per minute (01/04/18 0303)  O2 Device: Room air (01/05/18 0735)    Intake/Output Summary (Last 24 hours) at 01/06/18 0748  Last data filed at 01/05/18 1300   Gross per 24 hour   Intake              720 ml   Output                0 ml   Net              720 ml         General:    Well nourished. Alert. CV:   RRR. No murmur, rub, or gallop. Lungs:   CTAB. No wheezing, rhonchi, or rales. Abdomen:   Soft, nontender, nondistended. Extremities: Warm and dry. No cyanosis or edema. Skin:     No rashes or jaundice. Cellulitis R breast receding from demarcation line    Data Review:  I have reviewed all labs, meds, telemetry events, and studies from the last 24 hours.     Recent Results (from the past 24 hour(s))   GLUCOSE, POC    Collection Time: 01/05/18  8:42 AM   Result Value Ref Range    Glucose (POC) 307 (H) 65 - 100 mg/dL   GLUCOSE, POC    Collection Time: 01/05/18 11:17 AM   Result Value Ref Range    Glucose (POC) 277 (H) 65 - 100 mg/dL   GLUCOSE, POC    Collection Time: 01/05/18  4:41 PM   Result Value Ref Range    Glucose (POC) 243 (H) 65 - 100 mg/dL   GLUCOSE, POC    Collection Time: 01/05/18  9:13 PM   Result Value Ref Range    Glucose (POC) 256 (H) 65 - 100 mg/dL   GLUCOSE, POC    Collection Time: 01/06/18  5:30 AM   Result Value Ref Range    Glucose (POC) 282 (H) 65 - 100 mg/dL   PHOSPHORUS    Collection Time: 01/06/18  6:18 AM   Result Value Ref Range    Phosphorus 2.1 (L) 2.3 - 3.7 MG/DL        All Micro Results     Procedure Component Value Units Date/Time    CULTURE, URINE [[id number]]  (Abnormal) Collected:  01/03/18 2135    Order Status:  Completed Specimen:  Urine from Urine Updated:  01/06/18 0715     Special Requests: NO SPECIAL REQUESTS        Culture result:         >100,000 COLONIES/mL STAPHYLOCOCCUS AUREUS SENSITIVITY TO FOLLOW (A)              10,000 to 50,000 COLONIES/mL MIXED SKIN JOSUE ISOLATED    CULTURE, BLOOD [[id number]] Collected:  01/03/18 2134    Order Status:  Completed Specimen:  Blood from Blood Updated:  01/06/18 0613     Special Requests: --        LEFT  HAND       Culture result: NO GROWTH 3 DAYS       CULTURE, BLOOD [[id number]] Collected:  01/03/18 2135    Order Status:  Completed Specimen:  Blood from Blood Updated:  01/06/18 0613     Special Requests: --        RIGHT  HAND       Culture result: NO GROWTH 3 DAYS       MSSA/MRSA SC BY PCR, NASAL SWAB [[id number]]  (Abnormal) Collected:  01/04/18 1833    Order Status:  Completed Specimen:  Nasal Swab Updated:  01/04/18 2100     Special Requests: NO SPECIAL REQUESTS        Culture result:         MRSA target DNA not detected, SA target DNA detected. A MRSA negative, SA positive test result does not preclude MRSA nasal colonization.  (A)    MRSA SCREEN - PCR (NASAL) [[id number]]     Order Status:  Sent Specimen:  Nasal from Nares           Current Meds:  Current Facility-Administered Medications   Medication Dose Route Frequency    insulin glargine (LANTUS) injection 35 Units  35 Units SubCUTAneous DAILY    potassium, sodium phosphates (NEUTRA-PHOS) packet 2 Packet  2 Packet Oral QID    insulin lispro (HUMALOG) injection 5 Units  5 Units SubCUTAneous TIDAC    insulin lispro (HUMALOG) injection   SubCUTAneous AC&HS    clindamycin (CLEOCIN) capsule 300 mg  300 mg Oral Q8H    heparin (porcine) injection 5,000 Units  5,000 Units SubCUTAneous Q8H    sodium chloride (NS) flush 5-10 mL  5-10 mL IntraVENous Q8H    sodium chloride (NS) flush 5-10 mL  5-10 mL IntraVENous PRN    acetaminophen (TYLENOL) tablet 650 mg  650 mg Oral Q4H PRN    HYDROcodone-acetaminophen (NORCO) 5-325 mg per tablet 1 Tab  1 Tab Oral Q4H PRN    naloxone (NARCAN) injection 0.4 mg  0.4 mg IntraVENous PRN    lisinopril (PRINIVIL, ZESTRIL) tablet 10 mg  10 mg Oral DAILY    dextrose 40% (GLUTOSE) oral gel 1 Tube  15 g Oral PRN    glucagon (GLUCAGEN) injection 1 mg  1 mg IntraMUSCular PRN    dextrose (D50W) injection syrg 12.5-25 g  25-50 mL IntraVENous PRN    nystatin (MYCOSTATIN) 100,000 unit/gram powder   Topical TID    hydrALAZINE (APRESOLINE) 20 mg/mL injection 10 mg  10 mg IntraVENous Q6H PRN       Other Studies (last 24 hours):  No results found. Assessment and Plan:     Hospital Problems as of 1/6/2018  Never Reviewed          Codes Class Noted - Resolved POA    Cellulitis of right breast ICD-10-CM: N61.0  ICD-9-CM: 611.0  1/3/2018 - Present Yes        HTN (hypertension) (Chronic) ICD-10-CM: I10  ICD-9-CM: 401.9  1/3/2018 - Present Yes        Sepsis due to cellulitis [facility]) ICD-10-CM: L03.90, A41.9  ICD-9-CM: 682.9, 995.91  1/3/2018 - Present Yes        UTI (urinary tract infection) ICD-10-CM: N39.0  ICD-9-CM: 599.0  1/3/2018 - Present Yes        * (Principal)RESOLVED: DKA (diabetic ketoacidoses) ([facility] ICD-10-CM: E13.10  ICD-9-CM: 250.10  1/3/2018 - 1/5/2018 Yes              PLAN:    · DKA -  DM uncontrolled. Increase lantus to 35 units and cont prandial insulin 5 units. · Sepsis - another fever early this AM; occurring nightly. no abscess or mass on breast US.   Switch clinda/bactrim to vanc for cellulitis and staph in urine pending final sensitivity    DC planning/Dispo:  Home when ready  DVT ppx:  heparin    Signed:  Fabiana Bach MD

## 2018-01-06 NOTE — PROGRESS NOTES
Patient sitting up in chair talking to family members. Denies any pain and SOB at this time. Gait assessment indicated slight weakness. Was able to tolerate ambulating around the room well. Respirations even and unlabored at 16. Safety measures in place. Will continue to monitor.

## 2018-01-06 NOTE — PROGRESS NOTES
Problem: Falls - Risk of  Goal: *Absence of Falls  Document Keila Fall Risk and appropriate interventions in the flowsheet.    Outcome: Progressing Towards Goal  Fall Risk Interventions:  Mobility Interventions: Bed/chair exit alarm         Medication Interventions: Bed/chair exit alarm    Elimination Interventions: Call light in reach

## 2018-01-07 ENCOUNTER — HOME HEALTH ADMISSION (OUTPATIENT)
Dept: HOME HEALTH SERVICES | Facility: HOME HEALTH | Age: 63
End: 2018-01-07
Payer: COMMERCIAL

## 2018-01-07 VITALS
HEIGHT: 64 IN | RESPIRATION RATE: 20 BRPM | SYSTOLIC BLOOD PRESSURE: 151 MMHG | DIASTOLIC BLOOD PRESSURE: 83 MMHG | OXYGEN SATURATION: 98 % | WEIGHT: 230 LBS | BODY MASS INDEX: 39.27 KG/M2 | HEART RATE: 82 BPM | TEMPERATURE: 98.1 F

## 2018-01-07 PROBLEM — L03.90 SEPSIS DUE TO CELLULITIS (HCC): Status: RESOLVED | Noted: 2018-01-03 | Resolved: 2018-01-07

## 2018-01-07 PROBLEM — A41.9 SEPSIS DUE TO CELLULITIS (HCC): Status: RESOLVED | Noted: 2018-01-03 | Resolved: 2018-01-07

## 2018-01-07 LAB
ANION GAP SERPL CALC-SCNC: 10 MMOL/L (ref 7–16)
BUN SERPL-MCNC: 9 MG/DL (ref 8–23)
CALCIUM SERPL-MCNC: 8.2 MG/DL (ref 8.3–10.4)
CHLORIDE SERPL-SCNC: 108 MMOL/L (ref 98–107)
CO2 SERPL-SCNC: 22 MMOL/L (ref 21–32)
CREAT SERPL-MCNC: 0.76 MG/DL (ref 0.6–1)
ERYTHROCYTE [DISTWIDTH] IN BLOOD BY AUTOMATED COUNT: 15.2 % (ref 11.9–14.6)
GLUCOSE BLD STRIP.AUTO-MCNC: 178 MG/DL (ref 65–100)
GLUCOSE BLD STRIP.AUTO-MCNC: 273 MG/DL (ref 65–100)
GLUCOSE SERPL-MCNC: 172 MG/DL (ref 65–100)
HCT VFR BLD AUTO: 28.7 % (ref 35.8–46.3)
HGB BLD-MCNC: 9.2 G/DL (ref 11.7–15.4)
MCH RBC QN AUTO: 29 PG (ref 26.1–32.9)
MCHC RBC AUTO-ENTMCNC: 32.1 G/DL (ref 31.4–35)
MCV RBC AUTO: 90.5 FL (ref 79.6–97.8)
PHOSPHATE SERPL-MCNC: 2.4 MG/DL (ref 2.3–3.7)
PLATELET # BLD AUTO: 269 K/UL (ref 150–450)
PMV BLD AUTO: 10.7 FL (ref 10.8–14.1)
POTASSIUM SERPL-SCNC: 3.8 MMOL/L (ref 3.5–5.1)
RBC # BLD AUTO: 3.17 M/UL (ref 4.05–5.25)
SODIUM SERPL-SCNC: 140 MMOL/L (ref 136–145)
WBC # BLD AUTO: 11.8 K/UL (ref 4.3–11.1)

## 2018-01-07 PROCEDURE — 80048 BASIC METABOLIC PNL TOTAL CA: CPT | Performed by: INTERNAL MEDICINE

## 2018-01-07 PROCEDURE — 36415 COLL VENOUS BLD VENIPUNCTURE: CPT | Performed by: INTERNAL MEDICINE

## 2018-01-07 PROCEDURE — 74011250637 HC RX REV CODE- 250/637: Performed by: HOSPITALIST

## 2018-01-07 PROCEDURE — 82962 GLUCOSE BLOOD TEST: CPT

## 2018-01-07 PROCEDURE — 84100 ASSAY OF PHOSPHORUS: CPT | Performed by: INTERNAL MEDICINE

## 2018-01-07 PROCEDURE — 74011250636 HC RX REV CODE- 250/636: Performed by: INTERNAL MEDICINE

## 2018-01-07 PROCEDURE — 85027 COMPLETE CBC AUTOMATED: CPT | Performed by: INTERNAL MEDICINE

## 2018-01-07 PROCEDURE — 74011636637 HC RX REV CODE- 636/637: Performed by: INTERNAL MEDICINE

## 2018-01-07 RX ORDER — SULFAMETHOXAZOLE AND TRIMETHOPRIM 800; 160 MG/1; MG/1
1 TABLET ORAL 2 TIMES DAILY
Qty: 14 TAB | Refills: 0 | Status: SHIPPED | OUTPATIENT
Start: 2018-01-07 | End: 2018-01-14

## 2018-01-07 RX ORDER — SYRINGE AND NEEDLE,INSULIN,1ML 31GX15/64"
100 SYRINGE, EMPTY DISPOSABLE MISCELLANEOUS SEE ADMIN INSTRUCTIONS
Qty: 100 PEN NEEDLE | Refills: 11 | Status: SHIPPED | OUTPATIENT
Start: 2018-01-07

## 2018-01-07 RX ORDER — LANCETS
EACH MISCELLANEOUS
Qty: 1 EACH | Refills: 11 | Status: SHIPPED | OUTPATIENT
Start: 2018-01-07

## 2018-01-07 RX ORDER — INSULIN GLARGINE 100 [IU]/ML
40 INJECTION, SOLUTION SUBCUTANEOUS DAILY
Qty: 1 ADJUSTABLE DOSE PRE-FILLED PEN SYRINGE | Refills: 3 | Status: SHIPPED | OUTPATIENT
Start: 2018-01-07

## 2018-01-07 RX ORDER — LISINOPRIL 20 MG/1
20 TABLET ORAL DAILY
Qty: 30 TAB | Refills: 2 | Status: SHIPPED | OUTPATIENT
Start: 2018-01-07

## 2018-01-07 RX ORDER — INSULIN GLARGINE 100 [IU]/ML
40 INJECTION, SOLUTION SUBCUTANEOUS DAILY
Qty: 1 VIAL | Refills: 2 | Status: SHIPPED | OUTPATIENT
Start: 2018-01-07

## 2018-01-07 RX ORDER — CEPHALEXIN 500 MG/1
500 CAPSULE ORAL 2 TIMES DAILY
Qty: 14 CAP | Refills: 0 | Status: SHIPPED | OUTPATIENT
Start: 2018-01-07 | End: 2018-01-14

## 2018-01-07 RX ORDER — INSULIN LISPRO 100 [IU]/ML
5 INJECTION, SOLUTION INTRAVENOUS; SUBCUTANEOUS
Qty: 1 VIAL | Refills: 2 | Status: SHIPPED | OUTPATIENT
Start: 2018-01-07

## 2018-01-07 RX ORDER — NYSTATIN 100000 [USP'U]/G
POWDER TOPICAL 3 TIMES DAILY
Qty: 1 BOTTLE | Refills: 0 | Status: SHIPPED | OUTPATIENT
Start: 2018-01-07 | End: 2018-01-21

## 2018-01-07 RX ADMIN — INSULIN LISPRO 5 UNITS: 100 INJECTION, SOLUTION INTRAVENOUS; SUBCUTANEOUS at 08:23

## 2018-01-07 RX ADMIN — Medication 10 ML: at 06:00

## 2018-01-07 RX ADMIN — INSULIN LISPRO 5 UNITS: 100 INJECTION, SOLUTION INTRAVENOUS; SUBCUTANEOUS at 12:18

## 2018-01-07 RX ADMIN — INSULIN GLARGINE 40 UNITS: 100 INJECTION, SOLUTION SUBCUTANEOUS at 08:23

## 2018-01-07 RX ADMIN — INSULIN LISPRO 6 UNITS: 100 INJECTION, SOLUTION INTRAVENOUS; SUBCUTANEOUS at 12:18

## 2018-01-07 RX ADMIN — HEPARIN SODIUM 5000 UNITS: 5000 INJECTION, SOLUTION INTRAVENOUS; SUBCUTANEOUS at 08:24

## 2018-01-07 RX ADMIN — VANCOMYCIN HYDROCHLORIDE 1500 MG: 10 INJECTION, POWDER, LYOPHILIZED, FOR SOLUTION INTRAVENOUS at 08:26

## 2018-01-07 RX ADMIN — LISINOPRIL 10 MG: 5 TABLET ORAL at 08:24

## 2018-01-07 RX ADMIN — INSULIN LISPRO 2 UNITS: 100 INJECTION, SOLUTION INTRAVENOUS; SUBCUTANEOUS at 06:08

## 2018-01-07 RX ADMIN — NYSTATIN: 100000 POWDER TOPICAL at 08:25

## 2018-01-07 NOTE — PROGRESS NOTES
Report received from [place], [facility]. Patient sitting in chair. Family members present. No distress at this time. Will continue to monitor.

## 2018-01-07 NOTE — PROGRESS NOTES
No changed throughout the night. Patient slept well. Hung new bag of IV fluids. Respirations even and unlabored at 14. Safety measures in place. Will continue to monitor.

## 2018-01-07 NOTE — PROGRESS NOTES
Care Management Interventions  PCP Verified by CM:  Elinor Stoll MD)  Palliative Care Criteria Met (RRAT>21 & CHF Dx)?: Yes  Mode of Transport at Discharge:  (family)  Transition of Care Consult (CM Consult): [facility])  [facility] - INPATIENT: Yes  Physical Therapy Consult: No  Occupational Therapy Consult: No  Current Support Network: Family Lives [place], Lives with Spouse  Confirm Follow Up Transport: Family  Plan discussed with Pt/Family/Caregiver: Yes  Freedom of Choice Offered: Yes  Discharge Location  Discharge Placement: Home with [facility] for nursing care support of wound care and diabetes management.)

## 2018-01-07 NOTE — DISCHARGE INSTRUCTIONS
Cellulitis: Care Instructions  Your Care Instructions    Cellulitis is a skin infection. It often occurs after a break in the skin from a scrape, cut, bite, or puncture, or after a rash. The doctor has checked you carefully, but problems can develop later. If you notice any problems or new symptoms, get medical treatment right away. Follow-up care is a key part of your treatment and safety. Be sure to make and go to all appointments, and call your doctor if you are having problems. It's also a good idea to know your test results and keep a list of the medicines you take. How can you care for yourself at home? · Take your antibiotics as directed. Do not stop taking them just because you feel better. You need to take the full course of antibiotics. · Prop up the infected area on pillows to reduce pain and swelling. Try to keep the area above the level of your heart as often as you can. · If your doctor told you how to care for your wound, follow your doctor's instructions. If you did not get instructions, follow this general advice:  ¨ Wash the wound with clean water 2 times a day. Don't use hydrogen peroxide or alcohol, which can slow healing. ¨ You may cover the wound with a thin layer of petroleum jelly, such as Vaseline, and a nonstick bandage. ¨ Apply more petroleum jelly and replace the bandage as needed. · Be safe with medicines. Take pain medicines exactly as directed. ¨ If the doctor gave you a prescription medicine for pain, take it as prescribed. ¨ If you are not taking a prescription pain medicine, ask your doctor if you can take an over-the-counter medicine. To prevent cellulitis in the future  · Try to prevent cuts, scrapes, or other injuries to your skin. Cellulitis most often occurs where there is a break in the skin. · If you get a scrape, cut, mild burn, or bite, wash the wound with clean water as soon as you can to help avoid infection.  Don't use hydrogen peroxide or alcohol, which can slow healing. · If you have swelling in your legs (edema), support stockings and good skin care may help prevent leg sores and cellulitis. · Take care of your feet, especially if you have diabetes or other conditions that increase the risk of infection. Wear shoes and socks. Do not go barefoot. If you have athlete's foot or other skin problems on your feet, talk to your doctor about how to treat them. When should you call for help? Call your doctor now or seek immediate medical care if:  ? · You have signs that your infection is getting worse, such as:  ¨ Increased pain, swelling, warmth, or redness. ¨ Red streaks leading from the area. ¨ Pus draining from the area. ¨ A fever. ? · You get a rash. ? Watch closely for changes in your health, and be sure to contact your doctor if:  ? · You are not getting better after 1 day (24 hours). ? · You do not get better as expected. Where can you learn more? Go to http://todd-aleks.info/. Brennen Skates in the search box to learn more about \"Cellulitis: Care Instructions. \"  Current as of: October 13, 2016  Content Version: 11.4  © 6245-4322 [employer]. Care instructions adapted under license by [employer] (which disclaims liability or warranty for this information). If you have questions about a medical condition or this instruction, always ask your healthcare professional. [place] [address] any warranty or liability for your use of this information. DISCHARGE SUMMARY from Nurse    PATIENT INSTRUCTIONS:    After general anesthesia or intravenous sedation, for 24 hours or while taking prescription Narcotics:  · Limit your activities  · Do not drive and operate hazardous machinery  · Do not make important personal or business decisions  · Do  not drink alcoholic beverages  · If you have not urinated within 8 hours after discharge, please contact your surgeon on call.     Report the following to your surgeon:  · Excessive pain, swelling, redness or odor of or around the surgical area  · Temperature over 100.5  · Nausea and vomiting lasting longer than 4 hours or if unable to take medications  · Any signs of decreased circulation or nerve impairment to extremity: change in color, persistent  numbness, tingling, coldness or increase pain  · Any questions    What to do at Home:  Recommended activity: Activity as tolerated,     *  Please give a list of your current medications to your Primary Care Provider. *  Please update this list whenever your medications are discontinued, doses are      changed, or new medications (including over-the-counter products) are added. *  Please carry medication information at all times in case of emergency situations. These are general instructions for a healthy lifestyle:    No smoking/ No tobacco products/ Avoid exposure to second hand smoke  Surgeon General's Warning:  Quitting smoking now greatly reduces serious risk to your health. Obesity, smoking, and sedentary lifestyle greatly increases your risk for illness    A healthy diet, regular physical exercise & weight monitoring are important for maintaining a healthy lifestyle    You may be retaining fluid if you have a history of heart failure or if you experience any of the following symptoms:  Weight gain of 3 pounds or more overnight or 5 pounds in a week, increased swelling in our hands or feet or shortness of breath while lying flat in bed. Please call your doctor as soon as you notice any of these symptoms; do not wait until your next office visit. Recognize signs and symptoms of STROKE:    F-face looks uneven    A-arms unable to move or move unevenly    S-speech slurred or non-existent    T-time-call 911 as soon as signs and symptoms begin-DO NOT go       Back to bed or wait to see if you get better-TIME IS BRAIN. Warning Signs of HEART ATTACK     Call 911 if you have these symptoms:   Chest discomfort. Most heart attacks involve discomfort in the center of the chest that lasts more than a few minutes, or that goes away and comes back. It can feel like uncomfortable pressure, squeezing, fullness, or pain.  Discomfort in other areas of the upper body. Symptoms can include pain or discomfort in one or both arms, the back, neck, jaw, or stomach.  Shortness of breath with or without chest discomfort.  Other signs may include breaking out in a cold sweat, nausea, or lightheadedness. Don't wait more than five minutes to call 911 - MINUTES MATTER! Fast action can save your life. Calling 911 is almost always the fastest way to get lifesaving treatment. Emergency Medical Services staff can begin treatment when they arrive up to an hour sooner than if someone gets to the hospital by car. The discharge information has been reviewed with the patient. The patient verbalized understanding. Discharge medications reviewed with the patient and appropriate educational materials and side effects teaching were provided.   ___________________________________________________________________________________________________________________________________

## 2018-01-07 NOTE — PROGRESS NOTES
Discharge instruction and medication list reviewed and given to patient. Verbalizes understanding. Prescription reviewed and given. Peripheral IV removed with cath tip intact post removal. Exit site pink. Personal belonging with patient (see DC papers). Pt to schedule follow up appointment in AM. Verbalizes understanding. Insulin injection therapy reviewed. Pt states \"I can do my shots when I get home. My son can help me with shots if I needed since he is diabetic\". Right breast dressing done per order. Tolerated well. SW to arrange home health nurse for diabetic education and right breast wound care. Family at bedside. Discharged home via wheelchair and CNA [place] assisting with transport.

## 2018-01-07 NOTE — PROGRESS NOTES
Patient resting in bed, watching tv. Respirations even and unlabored at 16. Denies pain at this time. States, \" I'm glad my sugars are lower. \" BS: 133 at this moment. Observed patient's gait, noticed unsteadiness upon ambulation. Patient said that's \"normal.\" Safety measures in place. Will continue to monitor.

## 2018-01-07 NOTE — PROGRESS NOTES
Late entry  Right breast dressing changed per order. Tolerated well. Pt educated on dressing changes, how to clean and pack wound. Verbalizes understanding \"I know how to pack wounds. I had to do my brother's dressing changes. \" denies needs at present. Will continue to monitor.

## 2018-01-07 NOTE — PROGRESS NOTES
Verbal bedside report given to oncoming nurse Angi Murrieta. Patient's situation, background, assessment and recommendations provided. Opportunity for questions provided. No s/s of pain noted. No distress noted. Oncoming RN assumed care of patient.

## 2018-01-07 NOTE — PROGRESS NOTES
Bedside report received from night nurse Cydney. Assessment done as noted  Respiration even and unlabored 20/min; denies pain or nausea at present. Dressing to right breast CDI. Encouraged to call with needs.

## 2018-01-07 NOTE — DISCHARGE SUMMARY
Hospitalist Discharge Summary     Admit Date:  1/3/2018  6:47 PM   Name:  Shanti Rebollar   Age:  58 y.o.  :  1955   MRN:  [id number]   PCP:  Edgard Finley MD  Treatment Team: Attending Provider: Elisha Brar MD; Care Manager: Jimmy Webster. [employer]; Utilization Review: Celina Mason RN; Utilization Review: Ruth Cali    Problem List for this Hospitalization:  Hospital Problems as of 2018  Never Reviewed          Codes Class Noted - Resolved POA    Cellulitis of right breast ICD-10-CM: N61.0  ICD-9-CM: 611.0  1/3/2018 - Present Yes        HTN (hypertension) (Chronic) ICD-10-CM: I10  ICD-9-CM: 401.9  1/3/2018 - Present Yes        UTI (urinary tract infection) ICD-10-CM: N39.0  ICD-9-CM: 599.0  1/3/2018 - Present Yes        * (Principal)RESOLVED: [facility]acidoses) [facility] ICD-10-CM: E13.10  ICD-9-CM: 250.10  1/3/2018 - 2018 Yes        RESOLVED: Sepsis due to cellulitis [facility]) ICD-10-CM: L03.90, A41.9  ICD-9-CM: 682.9, 995.91  1/3/2018 - 2018 Yes                Admission HPI from 1/3/2018:    \" Pleasant 59 y/o female with hx diabetes presents from [facility] facility for admission for diabetic ketoacidosis and IV antibiotic treatment for cellulitis and abscess of right breast. She had developed some pain and redness of right breast and was evaluated. Glucose there was over 800. She received some IV insulin. She used to take metformin for her diabetes but stopped it 9 months ago after hearing negative things about it. A right breast abscess with cellulitis noticed and required incision and drainage. Then given IV clindamycin. Has a leukocytosis with left shift and tachycardia. Lactic acid 1.8. Glucose here 499 with a CO2 of 16 and anion gap 19. An insulin infusion started for DKA. She is fully alert and has no Kussmaul respirations. Urine sample is positive for ketones and appears positive for a UTI with 20-50 wbc, positive leuk esterase and 1+ bacteria.  Will admit to ICU for glucose stabilizing and IV antibiotics for sepsis, abscess and cellulitis and UTI. \"    Hospital Course: Insulin infusion, IVF started along with other DKA treatments and this resolved quickly. US R breast showed no abscess or masses. Urine culture grew staph. She was placed on vancomycin to cover both cellulitis and UTI. Sensitivities from urine below (MSSA). She was switched to keflex to cover possible strep cellulitis and will also cover her MSSA UTI. Will be given bactrim to cover possible MRSA cellulitis (better coverage than clinda). Her insulin was uptitrated several times and DM educator has seen patient. She is stable for discharge but needs close follow up. Follow up instructions and discharge meds at bottom of this note. Plan was discussed with pt. All questions answered. Patient was stable at time of discharge. Diagnostic Imaging/Tests:   Us Breast Rt Complete 4 Quad    Result Date: 1/4/2018  Right breast ultrasound INDICATION: Right breast abscess Ultrasound of the right breast shows diffuse soft tissue edema. No discrete fluid collection or mass is seen. There are small right axillary lymph nodes, the largest 9 mm. IMPRESSION: No abscess or mass identified in the right breast. BI-RADS Assessment Category 2: Benign finding. Annual mammograms are recommended for all women over the age of 36. A reminder letter will be sent to the patient. .      Echocardiogram results:  No results found for this visit on 01/03/18.       All Micro Results     Procedure Component Value Units Date/Time    CULTURE, URINE [[id number]]  (Abnormal)  (Susceptibility) Collected:  01/03/18 8040    Order Status:  Completed Specimen:  Urine from Urine Updated:  01/07/18 0642     Special Requests: NO SPECIAL REQUESTS        Culture result:         >100,000 COLONIES/mL STAPHYLOCOCCUS AUREUS SENSITIVITY TO FOLLOW (A)              10,000 to 50,000 COLONIES/mL MIXED SKIN JOSUE ISOLATED    CULTURE, BLOOD [[id number]] Collected:  01/03/18 2134    Order Status:  Completed Specimen:  Blood from Blood Updated:  01/06/18 0613     Special Requests: --        LEFT  HAND       Culture result: NO GROWTH 3 DAYS       CULTURE, BLOOD [[id number]] Collected:  01/03/18 2135    Order Status:  Completed Specimen:  Blood from Blood Updated:  01/06/18 0613     Special Requests: --        RIGHT  HAND       Culture result: NO GROWTH 3 DAYS       MSSA/MRSA SC BY PCR, NASAL SWAB [[id number]]  (Abnormal) Collected:  01/04/18 1833    Order Status:  Completed Specimen:  Nasal Swab Updated:  01/04/18 2100     Special Requests: NO SPECIAL REQUESTS        Culture result:         MRSA target DNA not detected, SA target DNA detected. A MRSA negative, SA positive test result does not preclude MRSA nasal colonization.  (A)    MRSA SCREEN - PCR (NASAL) [[id number]]     Order Status:  Sent Specimen:  Nasal from Nares         Culture & Susceptibility   STAPHYLOCOCCUS AUREUS   Antibiotic Sensitivity HORTENSIA Unit Status   Cefazolin ($) Susceptible <=2 ug/mL Final   Method: HORTENSIA   Clindamycin ($) Susceptible <=0.5 ug/mL Final   Method: HORTENSIA   Oxacillin Susceptible 0.5 ug/mL Final   Method: HORTENSIA   Tetracycline Resistant >8 ug/mL Final   Method: HORTENSIA   Trimeth-Sulfamethoxa Susceptible <=0.5/9.5 ug/mL Final   Method: HORTENSIA   Vancomycin ($) Susceptible <=0.5 ug/mL Final   Method: HORTENSIA            Labs: Results:       BMP, Mg, Phos Recent Labs      01/07/18   0647 01/06/18 0618 01/05/18   0729  01/05/18   0402  01/04/18   2317   NA  140   --    --   135*  135*   K  3.8   --    --   3.6  3.7   CL  108*   --    --   104  105   CO2  22   --    --   18*  18*   AGAP  10   --    --   13  12   BUN  9   --    --   11  13   CREA  0.76   --    --   0.91  0.90   CA  8.2*   --    --   8.2*  8.4   GLU  172*   --    --   263*  260*   PHOS  2.4  2.1*  1.1*   --    --       CBC Recent Labs      01/07/18   0647  01/06/18 0618 01/05/18   0729   WBC  11.8*  14.4*  15.7*   RBC  3.17* 3.42*  3.45*   HGB  9.2*  9.9*  10.2*   HCT  28.7*  31.0*  31.5*   PLT  269  262  277      LFT No results for input(s): SGOT, ALT, TBIL, AP, TP, ALB, GLOB, AGRAT, GPT in the last 72 hours. Cardiac Testing No results found for: BNPP, BNP, CPK, RCK1, RCK2, RCK3, RCK4, CKMB, CKNDX, CKND1, TROPT, TROIQ   Coagulation Tests No results found for: PTP, INR, APTT   A1c Lab Results   Component Value Date/Time    Hemoglobin A1c 13.1 01/04/2018 01:19 AM      Lipid Panel No results found for: CHOL, CHOLPOCT, CHOLX, CHLST, CHOLV, 522577, HDL, LDL, LDLC, DLDLP, 724154, VLDLC, VLDL, TGLX, TRIGL, TRIGP, TGLPOCT, CHHD, CHHDX   Thyroid Panel No results found for: TSH, T4, FT4, TT3, T3U, TSHEXT, TSHEXT     Most Recent UA Lab Results   Component Value Date/Time    Color YELLOW 01/03/2018 08:02 PM    Appearance CLOUDY 01/03/2018 08:02 PM    Specific gravity 1.016 01/03/2018 08:02 PM    pH (UA) 5.0 01/03/2018 08:02 PM    Protein 100 01/03/2018 08:02 PM    Glucose >1000 01/03/2018 08:02 PM    Ketone 40 01/03/2018 08:02 PM    Bilirubin NEGATIVE  01/03/2018 08:02 PM    Blood SMALL 01/03/2018 08:02 PM    Urobilinogen 0.2 01/03/2018 08:02 PM    Nitrites NEGATIVE  01/03/2018 08:02 PM    Leukocyte Esterase SMALL 01/03/2018 08:02 PM        Allergies   Allergen Reactions    Plavix [Clopidogrel] Rash       There is no immunization history on file for this patient.     All Labs from Last 24 Hrs:  Recent Results (from the past 24 hour(s))   GLUCOSE, POC    Collection Time: 01/06/18 11:12 AM   Result Value Ref Range    Glucose (POC) 320 (H) 65 - 100 mg/dL   GLUCOSE, POC    Collection Time: 01/06/18  3:30 PM   Result Value Ref Range    Glucose (POC) 407 (H) 65 - 100 mg/dL   GLUCOSE, POC    Collection Time: 01/06/18  8:22 PM   Result Value Ref Range    Glucose (POC) 133 (H) 65 - 100 mg/dL   GLUCOSE, POC    Collection Time: 01/07/18  5:10 AM   Result Value Ref Range    Glucose (POC) 178 (H) 65 - 100 mg/dL   CBC W/O DIFF    Collection Time: 01/07/18 6:47 AM   Result Value Ref Range    WBC 11.8 (H) 4.3 - 11.1 K/uL    RBC 3.17 (L) 4.05 - 5.25 M/uL    HGB 9.2 (L) 11.7 - 15.4 g/dL    HCT 28.7 (L) 35.8 - 46.3 %    MCV 90.5 79.6 - 97.8 FL    MCH 29.0 26.1 - 32.9 PG    MCHC 32.1 31.4 - 35.0 g/dL    RDW 15.2 (H) 11.9 - 14.6 %    PLATELET 279 786 - 275 K/uL    MPV 10.7 (L) 10.8 - 26.6 FL   METABOLIC PANEL, BASIC    Collection Time: 01/07/18  6:47 AM   Result Value Ref Range    Sodium 140 136 - 145 mmol/L    Potassium 3.8 3.5 - 5.1 mmol/L    Chloride 108 (H) 98 - 107 mmol/L    CO2 22 21 - 32 mmol/L    Anion gap 10 7 - 16 mmol/L    Glucose 172 (H) 65 - 100 mg/dL    BUN 9 8 - 23 MG/DL    Creatinine 0.76 0.6 - 1.0 MG/DL    GFR est AA >60 >60 ml/min/1.73m2    GFR est non-AA >60 >60 ml/min/1.73m2    Calcium 8.2 (L) 8.3 - 10.4 MG/DL   PHOSPHORUS    Collection Time: 01/07/18  6:47 AM   Result Value Ref Range    Phosphorus 2.4 2.3 - 3.7 MG/DL       Discharge Exam:  Patient Vitals for the past 24 hrs:   Temp Pulse Resp BP SpO2   01/07/18 0742 98.2 °F (36.8 °C) 83 20 144/76 98 %   01/07/18 0325 98.8 °F (37.1 °C) 86 20 132/76 97 %   01/06/18 2323 99.4 °F (37.4 °C) 91 18 149/77 99 %   01/06/18 1940 99.6 °F (37.6 °C) 93 18 156/78 98 %   01/06/18 1531 97.9 °F (36.6 °C) 91 18 170/82 100 %   01/06/18 1109 98.2 °F (36.8 °C) 89 18 145/81 99 %     Oxygen Therapy  O2 Sat (%): 98 % (01/07/18 0742)  Pulse via Oximetry: 95 beats per minute (01/04/18 0303)  O2 Device: Room air (01/05/18 0735)    Intake/Output Summary (Last 24 hours) at 01/07/18 0816  Last data filed at 01/06/18 1825   Gross per 24 hour   Intake              720 ml   Output                0 ml   Net              720 ml       General:    Well nourished. Alert. No distress. Eyes:   Normal sclera. Extraocular movements intact. ENT:  Normocephalic, atraumatic. Moist mucous membranes  CV:   Regular rate and rhythm. No murmur, rub, or gallop. Lungs:  Clear to auscultation bilaterally.   No wheezing, rhonchi, or rales.  Abdomen: Soft, nontender, nondistended. Bowel sounds normal.   Extremities: Warm and dry. No cyanosis or edema. Neurologic: CN II-XII grossly intact. Sensation intact. Skin:     No rashes or jaundice. Psych:  Normal mood and affect. Discharge Info:   Current Discharge Medication List      START taking these medications    Details   lisinopril (PRINIVIL, ZESTRIL) 20 mg tablet Take 1 Tab by mouth daily. Qty: 30 Tab, Refills: 2      insulin lispro (HUMALOG) 100 unit/mL injection 5 Units by SubCUTAneous route Before breakfast, lunch, and dinner. Qty: 1 Vial, Refills: 2      insulin glargine (LANTUS) 100 unit/mL injection 40 Units by SubCUTAneous route daily. Qty: 1 Vial, Refills: 2      cephALEXin (KEFLEX) 500 mg capsule Take 1 Cap by mouth two (2) times a day for 7 days. Qty: 14 Cap, Refills: 0      trimethoprim-sulfamethoxazole (BACTRIM DS, SEPTRA DS) 160-800 mg per tablet Take 1 Tab by mouth two (2) times a day for 7 days. Qty: 14 Tab, Refills: 0      Insulin Needles, Disposable, 30 gauge x 1/3\" by SubCUTAneous route See Admin Instructions. Qty: 1 Package, Refills: 11      Lancets misc For checking blood sugar level  Qty: 1 Each, Refills: 11      glucose blood VI test strips (BLOOD GLUCOSE TEST) strip Please dispense test strips for use with patient's glucose monitor  Qty: 100 Strip, Refills: 2      insulin syringe-needle U-100 0.3 mL 31 gauge x 15/64\" syrg 100 Syringes by Does Not Apply route See Admin Instructions.  Indications: for insulin administration  Qty: 100 Pen Needle, Refills: 11               Disposition: home    Activity: Activity as tolerated  Diet: DIET DIABETIC WITH OPTIONS Consistent Carb 1500-1600kcal    Follow-up Appointments   Procedures    FOLLOW UP VISIT Appointment in: 3 - 5 Days PCP for diabetes and cellulitis follow up     PCP for diabetes and cellulitis follow up     Standing Status:   Standing     Number of Occurrences:   1     Order Specific Question:   Appointment in     Answer:   3 - 5 Days         Follow-up Information     Follow up With Details Comments [address], [place] In 1 week within 1 week for follow up diabetes and cellulitis [address] [place] [address]  [phone]            Time spent in patient discharge planning and coordination 35 minutes.     Signed:  David Moraes MD

## 2018-01-07 NOTE — PROGRESS NOTES
Patient laying in chair, watching tv. No distress at this time. Safety measures in place. Will continue to monitor.

## 2018-01-08 ENCOUNTER — HOME CARE VISIT (OUTPATIENT)
Dept: SCHEDULING | Facility: HOME HEALTH | Age: 63
End: 2018-01-08
Payer: COMMERCIAL

## 2018-01-08 VITALS
OXYGEN SATURATION: 98 % | DIASTOLIC BLOOD PRESSURE: 72 MMHG | HEART RATE: 93 BPM | SYSTOLIC BLOOD PRESSURE: 148 MMHG | TEMPERATURE: 97.5 F | RESPIRATION RATE: 18 BRPM

## 2018-01-08 LAB
BACTERIA SPEC CULT: ABNORMAL
BACTERIA SPEC CULT: ABNORMAL
BACTERIA SPEC CULT: NORMAL
BACTERIA SPEC CULT: NORMAL
SERVICE CMNT-IMP: ABNORMAL
SERVICE CMNT-IMP: NORMAL
SERVICE CMNT-IMP: NORMAL

## 2018-01-08 PROCEDURE — G0299 HHS/HOSPICE OF RN EA 15 MIN: HCPCS

## 2018-01-08 PROCEDURE — 400013 HH SOC

## 2018-01-09 ENCOUNTER — HOME CARE VISIT (OUTPATIENT)
Dept: SCHEDULING | Facility: HOME HEALTH | Age: 63
End: 2018-01-09
Payer: COMMERCIAL

## 2018-01-09 VITALS
RESPIRATION RATE: 18 BRPM | TEMPERATURE: 98.2 F | HEART RATE: 71 BPM | OXYGEN SATURATION: 98 % | SYSTOLIC BLOOD PRESSURE: 150 MMHG | DIASTOLIC BLOOD PRESSURE: 70 MMHG

## 2018-01-09 PROCEDURE — G0299 HHS/HOSPICE OF RN EA 15 MIN: HCPCS

## 2018-01-10 ENCOUNTER — HOME CARE VISIT (OUTPATIENT)
Dept: SCHEDULING | Facility: HOME HEALTH | Age: 63
End: 2018-01-10
Payer: COMMERCIAL

## 2018-01-10 VITALS
SYSTOLIC BLOOD PRESSURE: 158 MMHG | RESPIRATION RATE: 18 BRPM | DIASTOLIC BLOOD PRESSURE: 80 MMHG | HEART RATE: 71 BPM | TEMPERATURE: 96.6 F

## 2018-01-10 PROCEDURE — G0299 HHS/HOSPICE OF RN EA 15 MIN: HCPCS

## 2018-01-11 ENCOUNTER — HOME CARE VISIT (OUTPATIENT)
Dept: HOME HEALTH SERVICES | Facility: HOME HEALTH | Age: 63
End: 2018-01-11
Payer: COMMERCIAL

## 2018-01-12 ENCOUNTER — HOME CARE VISIT (OUTPATIENT)
Dept: SCHEDULING | Facility: HOME HEALTH | Age: 63
End: 2018-01-12
Payer: COMMERCIAL

## 2018-01-12 PROCEDURE — G0299 HHS/HOSPICE OF RN EA 15 MIN: HCPCS

## 2018-01-14 VITALS
HEART RATE: 84 BPM | RESPIRATION RATE: 16 BRPM | TEMPERATURE: 98.7 F | DIASTOLIC BLOOD PRESSURE: 82 MMHG | SYSTOLIC BLOOD PRESSURE: 158 MMHG | OXYGEN SATURATION: 100 %

## 2018-01-15 ENCOUNTER — HOME CARE VISIT (OUTPATIENT)
Dept: SCHEDULING | Facility: HOME HEALTH | Age: 63
End: 2018-01-15
Payer: COMMERCIAL

## 2018-01-15 VITALS
DIASTOLIC BLOOD PRESSURE: 80 MMHG | SYSTOLIC BLOOD PRESSURE: 142 MMHG | TEMPERATURE: 97.6 F | HEART RATE: 78 BPM | RESPIRATION RATE: 20 BRPM

## 2018-01-15 PROCEDURE — A6218 NON-STERILE GAUZE > 48 SQ IN: HCPCS

## 2018-01-15 PROCEDURE — A4450 NON-WATERPROOF TAPE: HCPCS

## 2018-01-15 PROCEDURE — G0299 HHS/HOSPICE OF RN EA 15 MIN: HCPCS

## 2018-01-15 PROCEDURE — A6258 TRANSPARENT FILM >16<=48 IN: HCPCS

## 2018-01-17 ENCOUNTER — HOME CARE VISIT (OUTPATIENT)
Dept: HOME HEALTH SERVICES | Facility: HOME HEALTH | Age: 63
End: 2018-01-17
Payer: COMMERCIAL

## 2018-01-19 ENCOUNTER — HOME CARE VISIT (OUTPATIENT)
Dept: HOME HEALTH SERVICES | Facility: HOME HEALTH | Age: 63
End: 2018-01-19
Payer: COMMERCIAL